# Patient Record
Sex: MALE | Race: OTHER | Employment: OTHER | URBAN - METROPOLITAN AREA
[De-identification: names, ages, dates, MRNs, and addresses within clinical notes are randomized per-mention and may not be internally consistent; named-entity substitution may affect disease eponyms.]

---

## 2017-03-11 DIAGNOSIS — I10 ESSENTIAL HYPERTENSION WITH GOAL BLOOD PRESSURE LESS THAN 130/80: ICD-10-CM

## 2017-03-13 RX ORDER — LISINOPRIL 20 MG/1
TABLET ORAL
Qty: 90 TAB | Refills: 0 | Status: SHIPPED | OUTPATIENT
Start: 2017-03-13 | End: 2017-03-28 | Stop reason: SDUPTHER

## 2017-03-28 ENCOUNTER — OFFICE VISIT (OUTPATIENT)
Dept: FAMILY MEDICINE CLINIC | Age: 57
End: 2017-03-28

## 2017-03-28 VITALS
TEMPERATURE: 97.9 F | WEIGHT: 230 LBS | RESPIRATION RATE: 16 BRPM | HEIGHT: 68 IN | BODY MASS INDEX: 34.86 KG/M2 | OXYGEN SATURATION: 100 % | HEART RATE: 82 BPM | DIASTOLIC BLOOD PRESSURE: 69 MMHG | SYSTOLIC BLOOD PRESSURE: 119 MMHG

## 2017-03-28 DIAGNOSIS — E03.9 ACQUIRED HYPOTHYROIDISM: ICD-10-CM

## 2017-03-28 DIAGNOSIS — I10 ESSENTIAL HYPERTENSION: ICD-10-CM

## 2017-03-28 DIAGNOSIS — K21.9 GASTROESOPHAGEAL REFLUX DISEASE WITHOUT ESOPHAGITIS: Primary | ICD-10-CM

## 2017-03-28 DIAGNOSIS — M1A.0620 CHRONIC GOUT OF LEFT KNEE, UNSPECIFIED CAUSE: ICD-10-CM

## 2017-03-28 RX ORDER — LISINOPRIL 20 MG/1
20 TABLET ORAL DAILY
Qty: 90 TAB | Refills: 3 | Status: SHIPPED | OUTPATIENT
Start: 2017-03-28 | End: 2018-02-01 | Stop reason: SDUPTHER

## 2017-03-28 RX ORDER — OMEPRAZOLE 40 MG/1
40 CAPSULE, DELAYED RELEASE ORAL
Qty: 90 CAP | Refills: 1 | Status: SHIPPED | OUTPATIENT
Start: 2017-03-28 | End: 2018-02-01 | Stop reason: ALTCHOICE

## 2017-03-28 RX ORDER — LEVOTHYROXINE SODIUM 50 UG/1
50 TABLET ORAL
Qty: 90 TAB | Refills: 3 | Status: SHIPPED | OUTPATIENT
Start: 2017-03-28 | End: 2018-02-01 | Stop reason: SDUPTHER

## 2017-03-28 RX ORDER — INDOMETHACIN 50 MG/1
50 CAPSULE ORAL 3 TIMES DAILY
Qty: 30 CAP | Refills: 3 | Status: SHIPPED | OUTPATIENT
Start: 2017-03-28 | End: 2018-02-01 | Stop reason: SDUPTHER

## 2017-03-28 NOTE — MR AVS SNAPSHOT
Visit Information Cassi Blanton y Kaitlini Personal Médico Departamento Teléfono del Dep. Número de visita 3/28/2017  3:40 PM Michael Dior [facility] [phone] Upcoming Health Maintenance Date Due DTaP/Tdap/Td series (1 - Tdap) 3/4/1982 FOBT Q 1 YEAR AGE 50-75 3/4/2011 Alergias  Review Complete El: 3/28/2017 Por: Coleman Kohler LPN A partir del:  3/28/2017 Intensidad Anotado Tipo de reacción [employer] Allopurinol  03/19/2013    Rash Vacunas actuales Steven El [place] Influenza Vaccine 3/19/2015 Influenza Vaccine (Quad) PF 8/30/2016, 10/21/2015 No revisadas esta visita You Were Diagnosed With   
  
 Glennette Renea Gastroesophageal reflux disease without esophagitis    -  Primary ICD-10-CM: K21.9 ICD-9-CM: 530.81 Acquired hypothyroidism     ICD-10-CM: E03.9 ICD-9-CM: 244.9 Essential hypertension     ICD-10-CM: I10 
ICD-9-CM: 401.9 Chronic gout of left knee, unspecified cause     ICD-10-CM: M1A.0620 ICD-9-CM: 274.02 Partes vitales PS Pulso Temperatura Resp [place] ( percentil de crecimiento) Peso (percentil de crecimiento)  
 119/69 82 97.9 °F (36.6 °C) (Oral) 16 5' 8\" (1.727 m) 230 lb (104.3 kg) SpO2 BMI ([facility] Estatus de tabaquísmo 100% 34.97 kg/m2 Never Smoker Historial de signos vitales BMI and BSA Data Body Mass Index Body Surface Area 34.97 kg/m 2 2.24 m 2 [facility] Name Phone [facility], [address] [place] lista de medicamentos actualizada Lista actualizada el: 3/28/17  4:14 PM.  [place] use [place]mons lista de medicamentos más reciente. indomethacin 50 mg capsule También conocido fantasma:  INDOCIN Take 1 Cap by mouth three (3) times daily. levothyroxine 50 mcg tablet También conocido fantasma:  SYNTHROID Take 1 Tab by mouth Daily (before breakfast). lisinopril 20 mg tablet También conocido fantasma:  Jeanna Jose R Take 1 Tab by mouth daily. omeprazole 40 mg capsule También conocido fantasma:  Christa El Take 1 Cap by mouth Daily (before breakfast). polyethylene glycol 17 gram/dose powder También conocido fantasma:  Kathie Kehr Take 17 g by mouth daily. probenecid 500 mg tablet También conocido fantasma:  BENEMID Take 0.5 Tabs by mouth two (2) times a day. For gout prevention. After 1 week, increase to 500 mg twice daily. Recetas Enviado a la Snow Refills  
 levothyroxine (SYNTHROID) 50 mcg tablet 3 Sig: Take 1 Tab by mouth Daily (before breakfast). Class: Normal  
 Pharmacy: [address] Ph #: [phone] Route: Oral  
 lisinopril (PRINIVIL, ZESTRIL) 20 mg tablet 3 Sig: Take 1 Tab by mouth daily. Class: Normal  
 Pharmacy: [address] Ph #: [phone] Route: Oral  
 omeprazole (PRILOSEC) 40 mg capsule 1 Sig: Take 1 Cap by mouth Daily (before breakfast). Class: Normal  
 Pharmacy: [address] Ph #: [phone] Route: Oral  
 indomethacin (INDOCIN) 50 mg capsule 3 Sig: Take 1 Cap by mouth three (3) times daily. Class: Normal  
 Pharmacy: [address] Ph #: [phone] Route: Oral  
  
Hicimos lo siguiente METABOLIC PANEL, COMPREHENSIVE [59137 CPT(R)] REFERRAL TO GASTROENTEROLOGY [QXM58 Custom] Comentarios:  
 Please evaluate patient for uncontrolled acid reflux and possible IBS. [facility], [address] [place] [address] Office: [phone] TSH 3RD GENERATION [53501 CPT(R)] URIC ACID [id number] CPT(R)] Informacion de [employer] de Referencia Referido por Referido a  
  
 [id number] SANDY JAY No disponible Visitas Estado Lexine Daniel de inicio Lexine Daniel final  
 1 New Request 3/28/17 3/28/18 Si timmons referencia tiene un estado de \"pending review\" o \"denied\" , informacion adicional sera enviada para apoyar el resultado de esta decision. Instrucciones para el Paciente Enfermedad de reflujo gastroesofágico (GERD): Instrucciones de cuidado - [ Gastroesophageal Reflux Disease (GERD): Care Instructions ] Instrucciones de cuidado La enfermedad de reflujo gastroesofágico (GERD, por jeovn siglas en inglés) es cuando el ácido estomacal se devuelve hacia el esófago. El esófago es el conducto que va de la garganta al BJURHOLM. Bailey válvula de bailey zeynep vía karen que el ácido del estómago se devuelva por demetria conducto. Cuando usted tiene GERD, esta válvula no se steffanie lo suficientemente firme. Si usted tiene síntomas leves de GERD, fantasma acidez estomacal, es posible que pueda controlar el problema con antiácidos o medicamentos de [employer]. Cambiar la alimentación, bajar de peso y hacer otros cambios en el estilo de peri también pueden ayudar a reducir los síntomas. La atención de seguimiento es bailey parte clave de timmons tratamiento y seguridad. Asegúrese de hacer y acudir a todas las citas, y llame a timmons médico si está teniendo problemas. También es bailey buena idea saber los resultados de los exámenes y mantener bailey lista de los medicamentos que lorenzo. Cómo puede cuidarse en el hogar? · [employer] medicamentos exactamente fantasma le fueron recetados. Llame a timmons médico si ana maria estar teniendo problemas con timmons medicamento. · [place] médico le podría recomendar medicamentos de [facility]. Para la indigestión leve u ocasional pueden servirle los antiácidos fantasma Tums, Gaviscon, Mylanta o Maalox.  [place] médico también podría recomendar reductores de ácido de venta rosa, fantasma Pepcid AC, Tagamet HB, Zantac 75 o Prilosec. Mayuri y siga todas las instrucciones de la [place]. Si [place] estos medicamentos con frecuencia, hable con rhodes médico. 
· Cambie mary hábitos alimenticios. ¨ Es mejor comer varias comidas pequeñas en lugar de dos o yovani comidas abundantes. ¨ Después de comer, espere de 2 a 3 horas antes de recostarse. ¨ El chocolate, la menta y el alcohol pueden empeorar la GERD. ¨ En [employer], los alimentos condimentados, los alimentos que tienen mucho ácido (fantasma los tomates y las naranjas) y el café pueden empeorar los síntomas de la GERD. Si mary síntomas empeoran después de comer un determinado alimento, se recomienda que deje de comer alpesh alimento para narcisa si mary síntomas mejoran. · No fume ni masque tabaco. Fumar puede agravar la GERD. Si necesita ayuda para dejar de usar tabaco, hable con rhodes médico [employer] y medicamentos para dejar de usarlo. Éstos pueden aumentar mary probabilidades de dejar el hábito para siempre. · Si tiene síntomas de GERD matt la noche, eleve la cabecera de rhodes cama entre 6 y 6 pulgadas (entre 15 y 20 cm) colocando ladrillos debajo del paula de la cama o bailey cuña de espuma debajo de la cabecera del colchón. (Usar almohadas adicionales no funciona). · No use ropa que le ajuste mucho la parte media del cuerpo. · Baje de peso, si necesita hacerlo. Perder sólo de 5 a 10 libras (2.3 a 4.5 kg) puede ayudarle. Cuándo debe pedir ayuda? Llame a rhodes médico ahora mismo o busque atención médica inmediata si: · Tiene un dolor de estómago nuevo o distinto. · Mary heces son negruzcas y parecidas al alquitrán o tienen rastros de [place]. Preste especial atención a los cambios en rhodes filomena y asegúrese de comunicarse con rhodes médico si: 
· Mary síntomas no rowe willam después de 2 días. · La comida parece quedarle atorada en la garganta o el pecho. Dónde puede encontrar más información en inglés? Leoncio Scott a http://wen-alek.info/. Markharjit Saxena E339 en la búsqueda para aprender más acerca de \"Enfermedad de reflujo gastroesofágico (GERD): Instrucciones de cuidado - [ Gastroesophageal Reflux Disease (GERD): Care Instructions ]. \" 
Revisado: 9 agosto, 2016 Versión del contenido: 11.2 © 6034-5499 Healthwise, Incorporated. Las instrucciones de cuidado fueron adaptadas bajo licencia por Good Help Connections (which disclaims liability or warranty for this information). Si usted tiene [place] afección médica o sobre estas instrucciones, siempre pregunte a rhodes profesional de filomena. Healthwise, Incorporated niega toda garantía o responsabilidad por rhodes uso de esta información. Introducing [facility]! Estimado Roberto : 
Ciara por solicitar bailey cuenta de Toni singh. Nuestros registros indican que usted ya tiene bailey cuenta [facility]. Usted puede acceder a rhodes cuenta en cualquier momento en https://[employer]. [employer]/mychart Sabía usted que usted puede acceder a rhodes hospital y las instrucciones de suki ER en cualquier momento en MyChart ? También puede revisar [facility] de las pruebas de rhodes hospitalización o visita a urgencias . Información Adicional 
 
Si tiene alguna pregunta , por favor visite la sección de preguntas frecuentes del sitio web [employer] en https://[employer] Software[employer]ODUCTION[employer]. [employer]/mycGrand Perfectat/ . Recuerde, [employer] NO es que se utilizará para las necesidades urgentes. Para emergencias médicas , llame al 911 . Ahora disponible en rhodes iPhone y Android ! Por favor proporcione demetria resumen de la documentación de cuidado a rhodes próximo proveedor. Your primary care clinician is listed as Gina Karine. If you have any questions after today's visit, please call [phone].

## 2017-03-28 NOTE — PATIENT INSTRUCTIONS
Enfermedad de reflujo gastroesofágico (GERD): Instrucciones de cuidado - [ Gastroesophageal Reflux Disease (GERD): Care Instructions ]  Instrucciones de cuidado    La enfermedad de reflujo gastroesofágico (GERD, por jevon siglas en inglés) es cuando el ácido estomacal se devuelve hacia el esófago. El esófago es el conducto que va de la garganta al [facility]. Bailey válvula de bailey zeynep vía karen que el ácido del estómago se devuelva por demetria conducto. Cuando usted tiene GERD, esta válvula no se steffanie lo suficientemente firme. Si usted tiene síntomas leves de GERD, fantasma acidez estomacal, es posible que pueda controlar el problema con antiácidos o medicamentos de [employer]. Cambiar la alimentación, bajar de peso y hacer otros cambios en el estilo de peri también pueden ayudar a reducir los síntomas. La atención de seguimiento es bailey parte clave de rhodes tratamiento y seguridad. Asegúrese de hacer y acudir a todas las citas, y llame a rhodes médico si está teniendo problemas. También es bailey buena idea saber los resultados de los exámenes y mantener bailey lista de los medicamentos que lorenzo. ¿Cómo puede cuidarse en el hogar? · [employer] medicamentos exactamente fantasma le fueron recetados. Llame a rhodes médico si aan maria estar teniendo problemas con rhodes medicamento. · [place] médico le podría recomendar medicamentos de [employer]. Para la indigestión leve u ocasional pueden servirle los antiácidos fantasma Tums, Gaviscon, Mylanta o Maalox. [place] médico también podría recomendar reductores de ácido de venta rosa, fantasma Pepcid AC, Tagamet HB, Zantac 75 o Prilosec. Mayuri y siga todas las instrucciones de la [place]. Si [place] estos medicamentos con frecuencia, hable con rhodes médico.  · Cambie jevon hábitos alimenticios. ¨ Es mejor comer varias comidas pequeñas en lugar de dos o yovani comidas abundantes. ¨ Después de comer, espere de 2 a 3 horas antes de recostarse. ¨ El chocolate, la menta y el alcohol pueden empeorar la GERD.   ¨ En [employer], los alimentos condimentados, los alimentos que tienen mucho ácido (fantasma los tomates y las naranjas) y el café pueden empeorar los síntomas de la GERD. Si mary síntomas empeoran después de comer un determinado alimento, se recomienda que deje de comer alpesh alimento para narcisa si mary síntomas mejoran. · No fume ni masque tabaco. Fumar puede agravar la GERD. Si necesita ayuda para dejar de usar tabaco, hable con rhodes médico [employer] y medicamentos para dejar de usarlo. Éstos pueden aumentar mary probabilidades de dejar el hábito para siempre. · Si tiene síntomas de GERD matt la noche, eleve la cabecera de rhodes cama entre 6 y 6 pulgadas (entre 15 y 20 cm) colocando ladrillos debajo del paula de la cama o bailey cuña de espuma debajo de la cabecera del colchón. (Usar almohadas adicionales no funciona). · No use ropa que le ajuste mucho la parte media del cuerpo. · Baje de peso, si necesita hacerlo. Perder sólo de 5 a 10 libras (2.3 a 4.5 kg) puede ayudarle. ¿Cuándo debe pedir ayuda? Llame a rhodes médico ahora mismo o busque atención médica inmediata si:  · Tiene un dolor de estómago nuevo o distinto. · Mary heces son negruzcas y parecidas al alquitrán o tienen rastros de [place]. Preste especial atención a los cambios en rhodes filomena y asegúrese de comunicarse con rhodes médico si:  · Mary síntomas no rowe willam después de 2 días. · La comida parece quedarle atorada en la garganta o el pecho. ¿Dónde puede encontrar más información en inglés? Hiral Mak a http://wen-alek.info/. Marcelina Mirela H400 en la búsqueda para aprender más acerca de \"Enfermedad de reflujo gastroesofágico (GERD): Instrucciones de cuidado - [ Gastroesophageal Reflux Disease (GERD): Care Instructions ]. \"  Revisado: 9 agosto, 2016  Versión del contenido: 11.2  © 5376-8046 [employer], [employer]. Las instrucciones de cuidado fueron adaptadas bajo licencia por Good Help Connections (which disclaims liability or warranty for this information). Si usted tiene Bertie [place] afección médica o sobre estas instrucciones, siempre pregunte a rhodes profesional de filomena. [facility], Incorporated niega toda garantía o responsabilidad por rhodes uso de esta información.

## 2017-03-28 NOTE — PROGRESS NOTES
Outpatient Resident Progress Note    History of Present Illness:     Pt is a 65y.o. year old male, who presents to clinic for:    Chief Complaint   Patient presents with    GERD    Gout     knee and elbow       Patient comes in today for follow up. Has multiple medical problems. Reports compliance with medications and diet. Med list and most recent labs/studies reviewed with the patient. Not trying to be active physically to control weight. Needs med refills. Due for repeat labs. Complaining of gout flare 2 weeks ago on right knee, works as , tried indomethacin once and helped. Also complaining of reflux, this is a chronic problem, has been on Prilosec 40 mg for months, symptoms not improving despite meds and lifestyle modification. Past Medical History:   Diagnosis Date    Essential hypertension 7/10/2015    GERD without esophagitis     Gout 2000    Hypertension     Hypothyroidism, adult 7/10/2015    Thyroid disease     Hypothyroidism         Current Outpatient Prescriptions on File Prior to Visit   Medication Sig Dispense Refill    levothyroxine (SYNTHROID) 50 mcg tablet Take 1 Tab by mouth Daily (before breakfast). 90 Tab 3    lisinopril (PRINIVIL, ZESTRIL) 20 mg tablet Take 1 Tab by mouth daily. 90 Tab 3    indomethacin (INDOCIN) 50 mg capsule Take 1 Cap by mouth three (3) times daily. 30 Cap 3    omeprazole (PRILOSEC) 40 mg capsule Take 1 Cap by mouth Daily (before breakfast). 90 Cap 1    probenecid (BENEMID) 500 mg tablet Take 0.5 Tabs by mouth two (2) times a day. For gout prevention. After 1 week, increase to 500 mg twice daily. 60 Tab 5    polyethylene glycol (MIRALAX) 17 gram/dose powder Take 17 g by mouth daily. 510 g 2     No current facility-administered medications on file prior to visit. Allergies: Allergies   Allergen Reactions    Allopurinol Rash         ROS  No fevers, chills, unexpected weight changes. Normal appetite and bowel function.  No chest pain or pressure, no new dyspnea on exertion. + right knee pain, GERD    Objective:     Vitals:    03/28/17 1557   BP: 119/69   Pulse: 82   Resp: 16   Temp: 97.9 °F (36.6 °C)   TempSrc: Oral   SpO2: 100%   Weight: 230 lb (104.3 kg)   Height: 5' 8\" (1.727 m)        Physical Exam:  General appearance - alert, well appearing, and in no distress  Neck - supple, no significant adenopathy   Chest - clear to auscultation, no wheezes, rales or rhonchi, symmetric air entry  Heart - normal rate, regular rhythm, normal S1, S2, no murmurs, rubs, clicks or gallops  Abdomen - soft, nontender, nondistended, no masses or organomegaly  Neurological - alert, oriented, normal speech, no focal findings or movement disorder   Extremities - peripheral pulses normal, no pedal edema, no clubbing or cyanosis, no joint pain, full ROM in all 4 extremities  Skin - normal coloration and turgor, no rashes, no suspicious skin lesions noted    Assessment and Plan:   Pt is a 65y.o. year old male. Will refer to GI for evaluation and possible EGD, Indomethacin for acute gout, check labs today as below and medications refilled. ICD-10-CM ICD-9-CM    1. Gastroesophageal reflux disease without esophagitis K21.9 530.81 omeprazole (PRILOSEC) 40 mg capsule      REFERRAL TO GASTROENTEROLOGY   2. Acquired hypothyroidism E03.9 244.9 levothyroxine (SYNTHROID) 50 mcg tablet      TSH 3RD GENERATION   3. Essential hypertension I10 401.9 lisinopril (PRINIVIL, ZESTRIL) 20 mg tablet      METABOLIC PANEL, COMPREHENSIVE   4. Chronic gout of left knee, unspecified cause M1A.0620 274.02 indomethacin (INDOCIN) 50 mg capsule      URIC ACID           I have discussed the diagnosis with the patient and the intended plan as seen in the above orders. The patient has received an after-visit summary and questions were answered concerning future plans.     Yair Sosa MD  PGY-3 Family Medicine Resident

## 2017-03-29 LAB
ALBUMIN SERPL-MCNC: 4.5 G/DL (ref 3.5–5.5)
ALBUMIN/GLOB SERPL: 1.9 {RATIO} (ref 1.2–2.2)
ALP SERPL-CCNC: 56 IU/L (ref 39–117)
ALT SERPL-CCNC: 25 IU/L (ref 0–44)
AST SERPL-CCNC: 20 IU/L (ref 0–40)
BILIRUB SERPL-MCNC: 0.4 MG/DL (ref 0–1.2)
BUN SERPL-MCNC: 13 MG/DL (ref 6–24)
BUN/CREAT SERPL: 13 (ref 9–20)
CALCIUM SERPL-MCNC: 9.7 MG/DL (ref 8.7–10.2)
CHLORIDE SERPL-SCNC: 102 MMOL/L (ref 96–106)
CO2 SERPL-SCNC: 23 MMOL/L (ref 18–29)
CREAT SERPL-MCNC: 0.99 MG/DL (ref 0.76–1.27)
GLOBULIN SER CALC-MCNC: 2.4 G/DL (ref 1.5–4.5)
GLUCOSE SERPL-MCNC: 106 MG/DL (ref 65–99)
POTASSIUM SERPL-SCNC: 4.4 MMOL/L (ref 3.5–5.2)
PROT SERPL-MCNC: 6.9 G/DL (ref 6–8.5)
SODIUM SERPL-SCNC: 140 MMOL/L (ref 134–144)
TSH SERPL DL<=0.005 MIU/L-ACNC: 2.02 UIU/ML (ref 0.45–4.5)
URATE SERPL-MCNC: 10 MG/DL (ref 3.7–8.6)

## 2017-04-07 ENCOUNTER — TELEPHONE (OUTPATIENT)
Dept: FAMILY MEDICINE CLINIC | Age: 57
End: 2017-04-07

## 2017-04-07 NOTE — TELEPHONE ENCOUNTER
Nila Meng, Dr. Prabhakar Perkins  [phone]--[phone]  appt April 24, endoscopy  Need referral.    [phone]---[phone]    Tony Hatch has [employer] plan. Not reflected in chart. Also said patient was seen today but they had a referral for the visit of today. There is no message of any appointment for today in chart and the gastro referral ordered by Dr. Ambar Stanton is still open.

## 2017-04-12 NOTE — TELEPHONE ENCOUNTER
Patient is calling to change PCP to physician in this office. .. Referral is to be backdated to 4/7/17 with Dr Sherry Pimentel. ...

## 2017-04-14 NOTE — TELEPHONE ENCOUNTER
Still waiting on patient to change PCP to physician in this office. ... Then referral will be submitted. ...

## 2017-04-19 NOTE — TELEPHONE ENCOUNTER
Spoke with patient again for the 3rd time to get his PCP change to physician in this office so I can submit a referral for his 4/7/17 visit with Dr Norma Johnson (gastro). ...

## 2017-09-15 ENCOUNTER — OFFICE VISIT (OUTPATIENT)
Dept: FAMILY MEDICINE CLINIC | Age: 57
End: 2017-09-15

## 2017-09-15 VITALS
DIASTOLIC BLOOD PRESSURE: 73 MMHG | TEMPERATURE: 98 F | HEART RATE: 64 BPM | HEIGHT: 68 IN | WEIGHT: 229 LBS | SYSTOLIC BLOOD PRESSURE: 151 MMHG | OXYGEN SATURATION: 99 % | RESPIRATION RATE: 18 BRPM | BODY MASS INDEX: 34.71 KG/M2

## 2017-09-15 DIAGNOSIS — Z23 ENCOUNTER FOR IMMUNIZATION: ICD-10-CM

## 2017-09-15 DIAGNOSIS — I10 ESSENTIAL HYPERTENSION: Primary | ICD-10-CM

## 2017-09-15 DIAGNOSIS — M10.9 GOUT OF RIGHT FOOT, UNSPECIFIED CAUSE, UNSPECIFIED CHRONICITY: ICD-10-CM

## 2017-09-15 NOTE — PROGRESS NOTES
Chief Complaint   Patient presents with   [facility] Medication Refill

## 2017-09-15 NOTE — PROGRESS NOTES
Fortunato Martínez is a 65 y.o. male who presents   Gout  - no recent attacks. Admits he does not take his probenecid daily. - tries to eat less red meat. Occasional beers    HTN  - reports compliance with medications. Trying to watch his diet and walk daily. - denies HA, dizziness, CP, SOB, changes in vision, RUQ pain. ROS: (positive in bold)  General: wt loss, fever, fatigue or appetite change   Skin: rashes or suspicious skin lesions  HEENT: changes in vision, smell, taste, hearing, earache, tinnitus, hoarseness, dysphagia, congestion, sore throat  Cardiac: chest pain, palpitations, ROMERO, orthopnea, PND, edema   Pul: SOB, dyspnea, wheezing, cough, hemoptysis  GI: abdominal pain, N&V, diarrhea, constipation, hematemsis, melena,   : hematuria, dysuria, freq, urgency, incontinence   MS: joint pain, swelling, stiffness, myalgia, back pain  Neuro: weakness, parasthesias, headache, syncope, seizure  Psych: anxiety, depression    Past Medical History:  Past Medical History:   Diagnosis Date    Essential hypertension 7/10/2015    GERD without esophagitis     Gout 2000    Hypertension     Hypothyroidism, adult 7/10/2015    Thyroid disease     Hypothyroidism       Past Surgical History:  History reviewed. No pertinent surgical history. Family History:  Family History   Problem Relation Age of Onset    Hypertension Mother     Diabetes Mother      He thinks she had when older    Cancer Father 76     lung (non-smoker)       Allergies:   Allergies   Allergen Reactions    Allopurinol Rash       Social History:  Social History   Substance Use Topics    Smoking status: Never Smoker    Smokeless tobacco: Never Used    Alcohol use 0.0 oz/week     0 Standard drinks or equivalent per week      Comment: Ocassional       Current Meds:  Current Outpatient Prescriptions on File Prior to Visit   Medication Sig Dispense Refill    levothyroxine (SYNTHROID) 50 mcg tablet Take 1 Tab by mouth Daily (before breakfast). 90 Tab 3    lisinopril (PRINIVIL, ZESTRIL) 20 mg tablet Take 1 Tab by mouth daily. 90 Tab 3    omeprazole (PRILOSEC) 40 mg capsule Take 1 Cap by mouth Daily (before breakfast). 90 Cap 1    indomethacin (INDOCIN) 50 mg capsule Take 1 Cap by mouth three (3) times daily. 30 Cap 3    probenecid (BENEMID) 500 mg tablet Take 0.5 Tabs by mouth two (2) times a day. For gout prevention. After 1 week, increase to 500 mg twice daily. 60 Tab 5    polyethylene glycol (MIRALAX) 17 gram/dose powder Take 17 g by mouth daily. 510 g 2     No current facility-administered medications on file prior to visit. Visit Vitals    /73 (BP 1 Location: Left arm, BP Patient Position: Sitting)    Pulse 64    Temp 98 °F (36.7 °C) (Oral)    Resp 18    Ht 5' 8\" (1.727 m)    Wt 229 lb (103.9 kg)    SpO2 99%    BMI 34.82 kg/m2       Gen: Well developed, well nourished male in no acute distress  HEENT: normocephalic/atraumatic; PERRL; TM intact, translucent, and neutral BL;  oropharynx shows no erythema or exudates  Skin:  No rashes or suspicious skin lesions noted  Neck:   Supple, no lympadenopathy, no thyromegaly  Card:  RRR, no m/r/g  Chest:  CTAB, no w/r/r      A/P:      ICD-10-CM ICD-9-CM    1. Essential hypertension I10 401.9    2. Gout of right foot, unspecified cause, unspecified chronicity M10.9 274.9       HTN  - currently asymptomatic  - possibly outlier - all other BP readings are normal  - advised to f/u in 4 weeks for BP recheck - will change meds if still high    Gout  - currently asymptomatic  - counseled on taking probenecid daily.

## 2017-09-15 NOTE — PATIENT INSTRUCTIONS

## 2017-09-15 NOTE — MR AVS SNAPSHOT
Visit Information [place] Rene Personal Médico Departamento Teléfono del Dep. Número de visita 9/15/2017  9:15 AM Marquita Stockton [phone] [id number] Follow-up Instructions Return in about 4 weeks (around 10/13/2017) for PCP for hypertension. Upcoming Health Maintenance Date Due DTaP/Tdap/Td series (1 - Tdap) 3/4/1982 FOBT Q 1 YEAR AGE 50-75 3/4/2011 INFLUENZA AGE 9 TO ADULT 8/1/2017 Alergias  Review Complete El: 9/15/2017 Por: Susana Palencia LPN A partir del:  9/15/2017 Intensidad Anotado Tipo de reacción [employer] Allopurinol  03/19/2013    Rash Vacunas actuales Briseyda García Jeannie Influenza Vaccine 3/19/2015 Influenza Vaccine (Quad) PF 8/30/2016, 10/21/2015 No revisadas esta visita You Were Diagnosed With   
  
 Shellie Nguyen Essential hypertension    -  Primary ICD-10-CM: I10 
ICD-9-CM: 401.9 Encounter for long-term (current) use of medications     ICD-10-CM: Z79.899 ICD-9-CM: V58.69 Partes vitales PS Pulso Temperatura Resp [place] ( percentil de crecimiento) Peso (percentil de crecimiento) 151/73 (BP 1 Location: Left arm, BP Patient Position: Sitting) 64 98 °F (36.7 °C) (Oral) 18 5' 8\" (1.727 m) 229 lb (103.9 kg) SpO2 BMI ([facility] Estatus de tabaquísmo 99% 34.82 kg/m2 Never Smoker Historial de signos vitales BMI and BSA Data Body Mass Index Body Surface Area 34.82 kg/m 2 2.23 m 2 [facility] Name Phone [facility], [address] [place] [address] Timmons lista de medicamentos actualizada Lista actualizada el: 9/15/17  9:57 AM.  Cele Reed use timmons lista de medicamentos más reciente. indomethacin 50 mg capsule También conocido fantasma:  INDOCIN Take 1 Cap by mouth three (3) times daily. levothyroxine 50 mcg tablet También conocido fantasma:  SYNTHROID Take 1 Tab by mouth Daily (before breakfast). lisinopril 20 mg tablet También conocido fantasma:  Batsheva [place] Take 1 Tab by mouth daily. omeprazole 40 mg capsule También conocido fantasma:  Gordo Reyez Take 1 Cap by mouth Daily (before breakfast). polyethylene glycol 17 gram/dose powder También conocido fantasma:  Ronen Barahona Take 17 g by mouth daily. probenecid 500 mg tablet También conocido fantasma:  BENEMID Take 0.5 Tabs by mouth two (2) times a day. For gout prevention. After 1 week, increase to 500 mg twice daily. Instrucciones de seguimiento Return in about 4 weeks (around 10/13/2017) for PCP for hypertension. Instrucciones para el Paciente High Blood Pressure: Care Instructions Your Care Instructions If your blood pressure is usually above 140/90, you have high blood pressure, or hypertension. That means the top number is 140 or higher or the bottom number is 90 or higher, or both. Despite what a lot of people think, high blood pressure usually doesn't cause headaches or make you feel dizzy or lightheaded. It usually has no symptoms. But it does increase your risk for heart attack, stroke, and kidney or eye damage. The higher your blood pressure, the more your risk increases. Your doctor will give you a goal for your blood pressure. Your goal will be based on your health and your age. An example of a goal is to keep your blood pressure below 140/90. Lifestyle changes, such as eating healthy and being active, are always important to help lower blood pressure. You might also take medicine to reach your blood pressure goal. 
Follow-up care is a key part of your treatment and safety. Be sure to make and go to all appointments, and call your doctor if you are having problems. It's also a good idea to know your test results and keep a list of the medicines you take. How can you care for yourself at home? Medical treatment · If you stop taking your medicine, your blood pressure will go back up. You may take one or more types of medicine to lower your blood pressure. Be safe with medicines. Take your medicine exactly as prescribed. Call your doctor if you think you are having a problem with your medicine. · Talk to your doctor before you start taking aspirin every day. Aspirin can help certain people lower their risk of a heart attack or stroke. But taking aspirin isn't right for everyone, because it can cause serious bleeding. · See your doctor regularly. You may need to see the doctor more often at first or until your blood pressure comes down. · If you are taking blood pressure medicine, talk to your doctor before you take decongestants or anti-inflammatory medicine, such as ibuprofen. Some of these medicines can raise blood pressure. · Learn how to check your blood pressure at home. Lifestyle changes · Stay at a healthy weight. This is especially important if you put on weight around the waist. Losing even 10 pounds can help you lower your blood pressure. · If your doctor recommends it, get more exercise. Walking is a good choice. Bit by bit, increase the amount you walk every day. Try for at least 30 minutes on most days of the week. You also may want to swim, bike, or do other activities. · Avoid or limit alcohol. Talk to your doctor about whether you can drink any alcohol. · Try to limit how much sodium you eat to less than 2,300 milligrams (mg) a day. Your doctor may ask you to try to eat less than 1,500 mg a day. · Eat plenty of fruits (such as bananas and oranges), vegetables, legumes, whole grains, and low-fat dairy products. · Lower the amount of saturated fat in your diet. Saturated fat is found in animal products such as milk, cheese, and meat. Limiting these foods may help you lose weight and also lower your risk for heart disease. · Do not smoke. Smoking increases your risk for heart attack and stroke. If you need help quitting, talk to your doctor about stop-smoking programs and medicines. These can increase your chances of quitting for good. When should you call for help? Call 911 anytime you think you may need emergency care. This may mean having symptoms that suggest that your blood pressure is causing a serious heart or blood vessel problem. Your blood pressure may be over 180/110. For example, call 911 if: 
· You have symptoms of a heart attack. These may include: ¨ Chest pain or pressure, or a strange feeling in the chest. 
¨ Sweating. ¨ Shortness of breath. ¨ Nausea or vomiting. ¨ Pain, pressure, or a strange feeling in the back, neck, jaw, or upper belly or in one or both shoulders or arms. ¨ Lightheadedness or sudden weakness. ¨ A fast or irregular heartbeat. · You have symptoms of a stroke. These may include: 
¨ Sudden numbness, tingling, weakness, or loss of movement in your face, arm, or leg, especially on only one side of your body. ¨ Sudden vision changes. ¨ Sudden trouble speaking. ¨ Sudden confusion or trouble understanding simple statements. ¨ Sudden problems with walking or balance. ¨ A sudden, severe headache that is different from past headaches. · You have severe back or belly pain. Do not wait until your blood pressure comes down on its own. Get help right away. Call your doctor now or seek immediate care if: 
· Your blood pressure is much higher than normal (such as 180/110 or higher), but you don't have symptoms. · You think high blood pressure is causing symptoms, such as: ¨ Severe headache. ¨ Blurry vision. Watch closely for changes in your health, and be sure to contact your doctor if: 
· Your blood pressure measures 140/90 or higher at least 2 times. That means the top number is 140 or higher or the bottom number is 90 or higher, or both. · You think you may be having side effects from your blood pressure medicine. · Your blood pressure is usually normal, but it goes above normal at least 2 times. Where can you learn more? Go to http://wen-alek.info/. Enter O799 in the search box to learn more about \"High Blood Pressure: Care Instructions. \" Current as of: August 8, 2016 Content Version: 11.3 © 4787-4486 [employer]. Care instructions adapted under license by [employer] (which disclaims liability or warranty for this information). If you have questions about a medical condition or this instruction, always ask your healthcare professional. [place] [address] any warranty or liability for your use of this information. Introducing [facility]! Estimado Roberto : 
Ciara por solicitar bailey cuenta de MyChart usted. Nuestros registros indican que usted ya tiene bailey cuenta MyChart [facility]. Usted puede acceder a rhodes cuenta en cualquier momento en [email or web address]://[employer]mychart Sabía usted que usted puede acceder a rhodes hospital y las instrucciones de suki ER en cualquier momento en MyChart ? También puede revisar [facility] de las pruebas de rhodes hospitalización o visita a urgencias . Información Adicional 
 
Si tiene alguna pregunta , por favor visite la sección de preguntas frecuentes del sitio web MyChart en [email or web address] . Recuerde, MyChart NO es que se utilizará para las necesidades urgentes. Para emergencias médicas , llame al 911 . Ahora disponible en rhodes iPhone y Android ! Por favor proporcione demetria resumen de la documentación de cuidado a rhodes próximo proveedor. Your primary care clinician is listed as Loni Kimble. If you have any questions after today's visit, please call [phone].

## 2017-09-17 DIAGNOSIS — K59.01 SLOW TRANSIT CONSTIPATION: ICD-10-CM

## 2017-09-17 NOTE — TELEPHONE ENCOUNTER
Patient needs a refill of the following  Requested Prescriptions     Pending Prescriptions Disp Refills    polyethylene glycol (MIRALAX) 17 gram/dose powder 510 g 2     Sig: Take 17 g by mouth daily.

## 2017-09-21 RX ORDER — POLYETHYLENE GLYCOL 3350 17 G/17G
17 POWDER, FOR SOLUTION ORAL DAILY
Qty: 510 G | Refills: 2 | Status: SHIPPED | OUTPATIENT
Start: 2017-09-21 | End: 2018-02-01 | Stop reason: ALTCHOICE

## 2018-01-22 DIAGNOSIS — M1A.0620 CHRONIC GOUT OF LEFT KNEE, UNSPECIFIED CAUSE: ICD-10-CM

## 2018-01-22 NOTE — TELEPHONE ENCOUNTER
Patient needs a refill of the following  Requested Prescriptions     Pending Prescriptions Disp Refills    indomethacin (INDOCIN) 50 mg capsule 30 Cap 3     Sig: Take 1 Cap by mouth three (3) times daily.

## 2018-01-23 RX ORDER — INDOMETHACIN 50 MG/1
50 CAPSULE ORAL 3 TIMES DAILY
Qty: 30 CAP | Refills: 3 | OUTPATIENT
Start: 2018-01-23

## 2018-02-01 ENCOUNTER — OFFICE VISIT (OUTPATIENT)
Dept: FAMILY MEDICINE CLINIC | Age: 58
End: 2018-02-01

## 2018-02-01 ENCOUNTER — HOSPITAL ENCOUNTER (OUTPATIENT)
Dept: LAB | Age: 58
Discharge: HOME | End: 2018-02-01

## 2018-02-01 VITALS
BODY MASS INDEX: 34.4 KG/M2 | TEMPERATURE: 97.7 F | DIASTOLIC BLOOD PRESSURE: 88 MMHG | SYSTOLIC BLOOD PRESSURE: 134 MMHG | HEART RATE: 78 BPM | WEIGHT: 227 LBS | HEIGHT: 68 IN

## 2018-02-01 DIAGNOSIS — E03.9 ACQUIRED HYPOTHYROIDISM: ICD-10-CM

## 2018-02-01 DIAGNOSIS — I10 ESSENTIAL HYPERTENSION: Primary | ICD-10-CM

## 2018-02-01 DIAGNOSIS — M1A.0620 CHRONIC GOUT OF LEFT KNEE, UNSPECIFIED CAUSE: ICD-10-CM

## 2018-02-01 DIAGNOSIS — I10 ESSENTIAL HYPERTENSION: ICD-10-CM

## 2018-02-01 PROCEDURE — 81003 URINALYSIS AUTO W/O SCOPE: CPT | Performed by: NURSE PRACTITIONER

## 2018-02-01 PROCEDURE — 83036 HEMOGLOBIN GLYCOSYLATED A1C: CPT | Performed by: NURSE PRACTITIONER

## 2018-02-01 PROCEDURE — 82043 UR ALBUMIN QUANTITATIVE: CPT | Performed by: NURSE PRACTITIONER

## 2018-02-01 PROCEDURE — 84443 ASSAY THYROID STIM HORMONE: CPT | Performed by: NURSE PRACTITIONER

## 2018-02-01 PROCEDURE — 80053 COMPREHEN METABOLIC PANEL: CPT | Performed by: NURSE PRACTITIONER

## 2018-02-01 RX ORDER — LISINOPRIL 20 MG/1
20 TABLET ORAL DAILY
Qty: 90 TAB | Refills: 3 | Status: SHIPPED | OUTPATIENT
Start: 2018-02-01 | End: 2019-03-18 | Stop reason: SDUPTHER

## 2018-02-01 RX ORDER — LEVOTHYROXINE SODIUM 50 UG/1
50 TABLET ORAL
Qty: 90 TAB | Refills: 3 | Status: SHIPPED | OUTPATIENT
Start: 2018-02-01 | End: 2019-08-07 | Stop reason: SDUPTHER

## 2018-02-01 RX ORDER — INDOMETHACIN 50 MG/1
50 CAPSULE ORAL
Qty: 30 CAP | Refills: 3 | Status: SHIPPED | OUTPATIENT
Start: 2018-02-01 | End: 2019-08-07 | Stop reason: SDUPTHER

## 2018-02-01 RX ORDER — DICLOFENAC SODIUM 10 MG/G
2 GEL TOPICAL 4 TIMES DAILY
Qty: 100 G | Refills: 2 | Status: SHIPPED | OUTPATIENT
Start: 2018-02-01 | End: 2020-08-14

## 2018-02-01 RX ORDER — PROBENECID 500 MG/1
500 TABLET, FILM COATED ORAL 2 TIMES DAILY
Qty: 60 TAB | Refills: 5 | Status: SHIPPED | OUTPATIENT
Start: 2018-02-01 | End: 2020-08-14 | Stop reason: SDUPTHER

## 2018-02-01 NOTE — PROGRESS NOTES
Coordination of Care  1. Have you been to the ER, urgent care clinic since your last visit? Hospitalized since your last visit? No    2. Have you seen or consulted any other health care providers outside of the [address] [place] [address] since your last visit? Include any pap smears or colon screening. No    Medications  Does the patient need refills? YES    Learning Assessment Complete?  yes

## 2018-02-01 NOTE — MR AVS SNAPSHOT
[address] [place] [address] [address] [facility] 
[phone] Patient: Froilan Townsend MRN: [id number] VZO:[birth year] Visit Information Bartolome Ng Personal Médico Departamento Teléfono del Dep. Número de visita 2/1/2018 11:30 AM Jayne Wen NP [address] [phone] Follow-up Instructions Return in about 3 months (around 5/1/2018). Upcoming Health Maintenance Date Due DTaP/Tdap/Td series (1 - Tdap) 3/4/1982 FOBT Q 1 YEAR AGE 50-75 3/4/2011 Alergias  Review Complete El: 2/1/2018 Por: EMMA Vinson Shaper del:  2/1/2018 Intensidad Anotado Tipo de reacción [employer] Allopurinol  03/19/2013    Rash Vacunas actuales Ramond [place] Sunitha Stallion Influenza Vaccine 3/19/2015 Influenza Vaccine (Quad) PF 9/15/2017, 8/30/2016, 10/21/2015 No revisadas esta visita You Were Diagnosed With   
  
 Sandra Arabic Essential hypertension    -  Primary ICD-10-CM: I10 
ICD-9-CM: 401.9 Acquired hypothyroidism     ICD-10-CM: E03.9 ICD-9-CM: 593. 9 Chronic gout of left knee, unspecified cause     ICD-10-CM: M1A.0620 ICD-9-CM: 274.02 Partes vitales PS Pulso Temperatura [place] ( percentil de crecimiento) Peso (percentil de crecimiento) BMI ([facility])  
 134/88 (BP 1 Location: Left arm, BP Patient Position: Sitting) 78 97.7 °F (36.5 °C) (Oral) 5' 7.91\" (1.725 m) 227 lb (103 kg) 34.6 kg/m2 Estatus de tabaquísmo Never Smoker Historial de signos vitales BMI and BSA Data Body Mass Index Body Surface Area  
 34.6 kg/m 2 2.22 m 2 [facility] [address] [facility] [address] [place] [address] [phone] Timmons lista de medicamentos actualizada Lista actualizada el: 2/1/18  2:06 PM.  Ranjith Scott use rhodes lista de medicamentos más reciente. diclofenac 1 % Gel También conocido fantasma:  VOLTAREN Apply 2 g to affected area four (4) times daily. indomethacin 50 mg capsule También conocido fantasma:  INDOCIN Take 1 Cap by mouth three (3) times daily as needed. levothyroxine 50 mcg tablet También conocido fantasma:  SYNTHROID Take 1 Tab by mouth Daily (before breakfast). lisinopril 20 mg tablet También conocido fantasma:  Ladona Dunks Take 1 Tab by mouth daily. probenecid 500 mg tablet También conocido fantasma:  BENEMID Take 1 Tab by mouth two (2) times a day. For gout prevention. Recetas Enviado a la Obion Refills  
 levothyroxine (SYNTHROID) 50 mcg tablet 3 Sig: Take 1 Tab by mouth Daily (before breakfast). Class: Normal  
 Pharmacy: [facility] [address], [address] Ph #: [phone] Route: Oral  
 indomethacin (INDOCIN) 50 mg capsule 3 Sig: Take 1 Cap by mouth three (3) times daily as needed. Class: Normal  
 Pharmacy: [facility] [address], [address] Ph #: [phone] Route: Oral  
 lisinopril (PRINIVIL, ZESTRIL) 20 mg tablet 3 Sig: Take 1 Tab by mouth daily. Class: Normal  
 Pharmacy: [facility] [address], [address] Ph #: [phone] Route: Oral  
 probenecid (BENEMID) 500 mg tablet 5 Sig: Take 1 Tab by mouth two (2) times a day. For gout prevention. Class: Normal  
 Pharmacy: [facility] [address], [address] Ph #: [phone] Route: Oral  
 diclofenac (VOLTAREN) 1 % gel 2 Sig: Apply 2 g to affected area four (4) times daily. Class: Normal  
 Pharmacy: [facility] [address], [address] Ph #: [phone]  Route: Topical  
  
 Instrucciones de seguimiento Return in about 3 months (around 5/1/2018). Instrucciones para el Paciente Dieta restringida en purinas: Instrucciones de cuidado - [ Purine-Restricted Diet: Care Instructions ] Instrucciones de cuidado Brian Heath son sustancias que se encuentran en ciertos alimentos. Timmons cuerpo transforma las purinas en ácido úrico. Un nivel alto de ácido úrico puede causar gota, bailey forma de artritis que provoca dolor e inflamación en las articulaciones. Usted podría ayudar a controlar la cantidad de ácido úrico en timmons cuerpo limitando los alimentos con alto contenido en purinas de timmons Garcia Quang. La atención de seguimiento es bailey parte clave de timmons tratamiento y seguridad. Asegúrese de hacer y acudir a todas las citas, y llame a timmons médico si está teniendo problemas. También es bailey buena idea saber los resultados de los exámenes y mantener bailey lista de los medicamentos que lorenzo. Cómo puede cuidarse en el hogar? · Planifique jevon comidas y refrigerios con alimentos que carlos bajos en purinas y seguros para usted. Estos alimentos incluyen: ¨ Verduras verdes y tomates. Sarah Mares. Daina Spikes, arroz y cereales integrales. ¨ Huevos, mantequilla de cacahuate (maní) y nueces. José Luis Gravely, queso y otros productos lácteos semidescremados. ¨ Palomitas de maíz (\"popcorn\"). ¨ Postres de gelatina, chocolate, cacao, así fantasma tartas y dulces en pequeñas cantidades. · Puede comer alimentos que carlos medianamente ricos en purinas, katelin solo de vez en cuando. Estos alimentos incluyen: ¨ Legumbres, fantasma frijoles (habichuelas) secos y arvejas (chícharos) secas. Puede comer 1 taza de legumbres cocidas al día. ¨ Espárragos, coliflor, espinacas, hongos y arvejas (chícharos). ¨ Pescados y mariscos (que no carlos mariscos muy ricos en purinas). ¨ Obinna, salvado de yaya y germen de [place]. · Limite los alimentos muy ricos en purinas fantasma: 
¨ Vísceras, fantasma hígado, riñones, mollejas y sesos. ¨ Shabbir, incluyendo tocino, res, cerdo y gomez. ¨ Shabbir de animales de caza y cualquier otro tipo de carne en grandes cantidades. ¨ Anchoas, savanna, arenque, caballa y vieiras. ¨ Salsa de carne (\"gravy\"). ¨ Cerveza. Dónde puede encontrar más información en inglés? Waleska Officer a http://wen-laek.info/. Ramona F448 en la búsqueda para aprender más acerca de \"Dieta restringida en purinas: Instrucciones de cuidado - [ Purine-Restricted Diet: Care Instructions ]. \" 
Revisado: 12 mayo, 2017 Versión del contenido: 11.4 © 8186-0021 Healthwise, Incorporated. Las instrucciones de cuidado fueron adaptadas bajo licencia por [employer] (which disclaims liability or warranty for this information). Si usted tiene [place] afección médica o sobre estas instrucciones, siempre pregunte a rhodes profesional de filomena. Healthwise, Incorporated niega toda garantía o responsabilidad por rhodes uso de esta información. Introducing [facility]! Estimado Roberto : 
Ciara por solicitar bailey cuenta de Toni singh. Nuestros registros indican que usted ya tiene bailey cuenta Toni [place]. Usted puede acceder a rhodes cuenta en cualquier momento en [email or web address]. [employer]. Leadjini/mychart Sabía usted que usted puede acceder a rhodes hospital y las instrucciones de suki ER en cualquier momento en MyChart ? También puede revisar [employer] de las pruebas de rhodes hospitalización o visita a urgencias . Información Adicional 
 
Si tiene alguna pregunta , por favor visite la sección de preguntas frecuentes del sitio web MyChart en [email or web address]. [email or web address] . Recuerde, MyChart NO es que se utilizará para las necesidades urgentes. Para emergencias médicas , llame al 911 . Ahora disponible en rhodes iPhone y Android ! Por favor proporcione demetria resumen de la documentación de cuidado a rhodes próximo proveedor. Your primary care clinician is listed as Ceasar Apley. If you have any questions after today's visit, please call [phone].

## 2018-02-01 NOTE — PROGRESS NOTES
Subjective:     Chief Complaint   Patient presents with    Medication Refill    Knee Pain        He  is a 65 y.o. male who presents for evaluation of HTN and knee pain. Pt has been out of HTN Rx x 1 year. Has been on same dose of Lisinopril and synthroid x 3-4 years. No recent dose adjustments. Takes indocin PRN 2-3x week for knee pain. Pain usually in L knee. Works in construction/ceiling work, and is up and down ladders frequently. Has noted in the last year, Pt has been more concerned and stressed about his missing L ring finger   R/t avoiding groups, low energy, etc.   Is requesting eval for CBT/counseling since he recognizes his reaction/stress is not normal.   Original injury/limb loss > 35 years ago. Occasional etoh use, no drugs. No SI/HI. Flu vaccine UTD. ROS  Gen - no fever/chills  Resp - no dyspnea or cough  CV - no chest pain or ROMERO  Rest per HPI    Past Medical History:   Diagnosis Date    Essential hypertension 7/10/2015    GERD without esophagitis     Gout 2000    Hypertension     Hypothyroidism, adult 7/10/2015    Thyroid disease     Hypothyroidism     No past surgical history on file. Current Outpatient Prescriptions on File Prior to Visit   Medication Sig Dispense Refill    polyethylene glycol (MIRALAX) 17 gram/dose powder Take 17 g by mouth daily. 510 g 2    levothyroxine (SYNTHROID) 50 mcg tablet Take 1 Tab by mouth Daily (before breakfast). 90 Tab 3    lisinopril (PRINIVIL, ZESTRIL) 20 mg tablet Take 1 Tab by mouth daily. 90 Tab 3    omeprazole (PRILOSEC) 40 mg capsule Take 1 Cap by mouth Daily (before breakfast). 90 Cap 1    indomethacin (INDOCIN) 50 mg capsule Take 1 Cap by mouth three (3) times daily. 30 Cap 3    probenecid (BENEMID) 500 mg tablet Take 0.5 Tabs by mouth two (2) times a day. For gout prevention. After 1 week, increase to 500 mg twice daily. 60 Tab 5     No current facility-administered medications on file prior to visit. Objective:     Vitals:    02/01/18 1144 02/01/18 1150   BP: 155/90 134/88   Pulse: 84 78   Temp: 97.7 °F (36.5 °C)    TempSrc: Oral    Weight: 227 lb (103 kg)    Height: 5' 7.91\" (1.725 m)        Physical Examination:  General appearance - alert, well appearing, and in no distress  Eyes -sclera anicteric  Neck - supple, no significant adenopathy, no thyromegaly  Chest - clear to auscultation, no wheezes, rales or rhonchi, symmetric air entry  Heart - normal rate, regular rhythm, normal S1, S2, no murmurs, rubs, clicks or gallops  Neurological - alert, oriented, no focal findings or movement disorder noted  Abdomen-BS present/WNL x 4 quads, non-tender/distended, soft,no organomegaly    Assessment/ Plan:   Diagnoses and all orders for this visit:    1. Essential hypertension  -     METABOLIC PANEL, COMPREHENSIVE; Future  -     URINALYSIS W/ RFLX MICROSCOPIC; Future  -     HEMOGLOBIN A1C WITH EAG; Future  -     MICROALBUMIN, UR, RAND W/ MICROALBUMIN/CREA RATIO; Future  -     lisinopril (PRINIVIL, ZESTRIL) 20 mg tablet; Take 1 Tab by mouth daily. 2. Acquired hypothyroidism  -     levothyroxine (SYNTHROID) 50 mcg tablet; Take 1 Tab by mouth Daily (before breakfast). -     TSH 3RD GENERATION; Future    3. Chronic gout of left knee, unspecified cause  -     indomethacin (INDOCIN) 50 mg capsule; Take 1 Cap by mouth three (3) times daily as needed. -     probenecid (BENEMID) 500 mg tablet; Take 1 Tab by mouth two (2) times a day. For gout prevention.  -     diclofenac (VOLTAREN) 1 % gel; Apply 2 g to affected area four (4) times daily. Refill current Rx. Check baseline labs. Discussed diet changes r/t gout. Attempt trial of voltaren gel r/t gout and suspected OA pain. Refer to George Yang r/t concerns over finger, grief/loss and/or some recent provoking incident that has caused Pt to have concerns r/t finger. Re-eval in 2-3 months.      Change POC if labs abnormal.      I have discussed the diagnosis with the patient and the intended plan as seen in the above orders. The patient has received an after-visit summary and questions were answered concerning future plans. I have discussed medication side effects and warnings with the patient as well. The patient verbalizes understanding and agreement with the plan.     Follow-up Disposition: Not on File

## 2018-02-01 NOTE — PROGRESS NOTES
AVS printed and reviewed. E scripts discussed. Good Rx coupons done for all meds except Lisinopril and levothyroxine. Indomethacin cost $ 8.71, Benemid $23.51, Voltaren gel $22.02. Appts discussed at registration. Declined flu vaccine today \"had in Sept 2017. \"

## 2018-02-01 NOTE — PATIENT INSTRUCTIONS
Dieta restringida en purinas: Instrucciones de cuidado - [ Purine-Restricted Diet: Care Instructions ]  Instrucciones de [address] son sustancias que se encuentran en ciertos alimentos. Rhodes cuerpo transforma las purinas en ácido úrico. Un nivel alto de ácido úrico puede causar gota, bailey forma de artritis que provoca dolor e inflamación en las articulaciones. Usted podría ayudar a controlar la cantidad de ácido úrico en rhodes cuerpo limitando los alimentos con alto contenido en purinas de rhodes Daniella Dominick. La atención de seguimiento es bailey parte clave de rhodes tratamiento y seguridad. Asegúrese de hacer y acudir a todas las citas, y llame a rhodes médico si está teniendo problemas. También es bailey buena idea saber los resultados de los exámenes y mantener bailey lista de los medicamentos que lorenzo. ¿Cómo puede cuidarse en el hogar? · Planifique jevon comidas y refrigerios con alimentos que carlos bajos en purinas y seguros para usted. Estos alimentos incluyen:  ¨ Verduras verdes y tomates. Genevie Primrose Genora Saas. Dorene Better, arroz y cereales integrales. ¨ Huevos, mantequilla de cacahuate (maní) y nueces. Edmonia Gianotti, queso y otros productos lácteos semidescremados. ¨ Palomitas de maíz (\"popcorn\"). ¨ Postres de gelatina, chocolate, cacao, así fantasma tartas y dulces en pequeñas cantidades. · Puede comer alimentos que carlos medianamente ricos en purinas, katelin solo de vez en cuando. Estos alimentos incluyen:  ¨ Legumbres, fantasma frijoles (habichuelas) secos y arvejas (chícharos) secas. Puede comer 1 taza de legumbres cocidas al día. ¨ Espárragos, coliflor, espinacas, hongos y arvejas (chícharos). ¨ Pescados y mariscos (que no carlos mariscos muy ricos en purinas). ¨ Obinna, salvado de yaya y germen de [place]. · Limite los alimentos muy ricos en purinas fantasma:  ¨ Vísceras, fantasma hígado, riñones, mollejas y sesos. ¨ Shabbir, incluyendo tocino, res, cerdo y gomez.   ¨ Shabbir de animales de caza y cualquier otro tipo de carne en grandes cantidades. ¨ Anchoas, savanna, emmaque, cabgerrya y vieiras. ¨ Salsa de carne (\"gravy\"). ¨ Cerveza. ¿Dónde puede encontrar más información en inglés? Star Presume a http://wen-alek.info/. Escriba F448 en la búsqueda para aprender más acerca de \"Dieta restringida en purinas: Instrucciones de cuidado - [ Purine-Restricted Diet: Care Instructions ]. \"  Revisado: 12 velazco, 2017  Versión del contenido: 11.4  © 1000-7701 Healthwise, Incorporated. Las instrucciones de cuidado fueron adaptadas bajo licencia por Good Help Connections (which disclaims liability or warranty for this information). Si usted tiene [place] afección médica o sobre estas instrucciones, siempre pregunte a rhodes profesional de filomena. Healthwise, Incorporated niega toda garantía o responsabilidad por rhodes uso de esta información.

## 2018-02-02 LAB
ALBUMIN SERPL-MCNC: 4.3 G/DL (ref 3.5–5)
ALBUMIN/GLOB SERPL: 1.3 {RATIO} (ref 1.1–2.2)
ALP SERPL-CCNC: 59 U/L (ref 45–117)
ALT SERPL-CCNC: 86 U/L (ref 12–78)
ANION GAP SERPL CALC-SCNC: 9 MMOL/L (ref 5–15)
APPEARANCE UR: CLEAR
AST SERPL-CCNC: 48 U/L (ref 15–37)
BILIRUB SERPL-MCNC: 0.8 MG/DL (ref 0.2–1)
BILIRUB UR QL: NEGATIVE
BUN SERPL-MCNC: 14 MG/DL (ref 6–20)
BUN/CREAT SERPL: 14 (ref 12–20)
CALCIUM SERPL-MCNC: 9.4 MG/DL (ref 8.5–10.1)
CHLORIDE SERPL-SCNC: 103 MMOL/L (ref 97–108)
CO2 SERPL-SCNC: 25 MMOL/L (ref 21–32)
COLOR UR: NORMAL
CREAT SERPL-MCNC: 0.98 MG/DL (ref 0.7–1.3)
CREAT UR-MCNC: 111 MG/DL
EST. AVERAGE GLUCOSE BLD GHB EST-MCNC: 105 MG/DL
GLOBULIN SER CALC-MCNC: 3.4 G/DL (ref 2–4)
GLUCOSE SERPL-MCNC: 90 MG/DL (ref 65–100)
GLUCOSE UR STRIP.AUTO-MCNC: NEGATIVE MG/DL
HBA1C MFR BLD: 5.3 % (ref 4.2–6.3)
HGB UR QL STRIP: NEGATIVE
KETONES UR QL STRIP.AUTO: NEGATIVE MG/DL
LEUKOCYTE ESTERASE UR QL STRIP.AUTO: NEGATIVE
MICROALBUMIN UR-MCNC: <0.5 MG/DL
MICROALBUMIN/CREAT UR-RTO: NORMAL MG/G (ref 0–30)
NITRITE UR QL STRIP.AUTO: NEGATIVE
PH UR STRIP: 6.5 [PH] (ref 5–8)
POTASSIUM SERPL-SCNC: 4.3 MMOL/L (ref 3.5–5.1)
PROT SERPL-MCNC: 7.7 G/DL (ref 6.4–8.2)
PROT UR STRIP-MCNC: NEGATIVE MG/DL
SODIUM SERPL-SCNC: 137 MMOL/L (ref 136–145)
SP GR UR REFRACTOMETRY: 1.01 (ref 1–1.03)
TSH SERPL DL<=0.05 MIU/L-ACNC: 1.44 UIU/ML (ref 0.36–3.74)
UROBILINOGEN UR QL STRIP.AUTO: 0.2 EU/DL (ref 0.2–1)

## 2018-04-02 ENCOUNTER — OFFICE VISIT (OUTPATIENT)
Dept: FAMILY MEDICINE CLINIC | Age: 58
End: 2018-04-02

## 2018-04-02 VITALS
HEART RATE: 70 BPM | WEIGHT: 226 LBS | BODY MASS INDEX: 34.45 KG/M2 | SYSTOLIC BLOOD PRESSURE: 118 MMHG | TEMPERATURE: 98.2 F | DIASTOLIC BLOOD PRESSURE: 76 MMHG

## 2018-04-02 DIAGNOSIS — H04.129 DRY EYE: ICD-10-CM

## 2018-04-02 DIAGNOSIS — H57.89 IRRITATION OF BOTH EYES: Primary | ICD-10-CM

## 2018-04-02 NOTE — PROGRESS NOTES
Coordination of Care  1. Have you been to the ER, urgent care clinic since your last visit? Hospitalized since your last visit? Yes When: hospital in [facility], 03/2018, for elevated blood pressure    2. Have you seen or consulted any other health care providers outside of the [facility] since your last visit? Include any pap smears or colon screening. Yes When: dr.Walter Brandon, 3/2018, for f/u lab work    Does the patient need refills? NO    Learning Assessment Complete?  yes

## 2018-04-02 NOTE — MR AVS SNAPSHOT
[address] [place] [address] [address] [facility] 
[phone] Patient: Manish Watson MRN: [id number] URY:[id number] Visit Information [facility] Personal Médico Departamento Teléfono del Dep. Número de visita 4/2/2018 10:00 AM Yanelis Guaman MD [address] [phone] Follow-up Instructions Return if symptoms worsen or fail to improve. Follow-up and Disposition History Upcoming Health Maintenance Date Due DTaP/Tdap/Td series (1 - Tdap) 3/4/1982 FOBT Q 1 YEAR AGE 50-75 3/4/2011 Alergias  Review Complete El: 4/2/2018 Por: Yanelis Guaman MD  
 A partir del:  4/2/2018 Intensidad Anotado Tipo de reacción [facility] Allopurinol  03/19/2013    Rash Vacunas actuales Charles Smart Samuel Influenza Vaccine 3/19/2015 Influenza Vaccine (Quad) PF 9/15/2017, 8/30/2016, 10/21/2015 No revisadas esta visita You Were Diagnosed With   
  
 Tanda Cue Irritation of both eyes    -  Primary ICD-10-CM: H57.8 ICD-9-CM: 379.99 Dry eye     ICD-10-CM: H46.536 ICD-9-CM: 375.15 Partes vitales PS Pulso Temperatura Peso (percentil de crecimiento) BMI ([facility]) Estatus de tabaquísmo 118/76 (BP 1 Location: Left arm, BP Patient Position: Sitting) 70 98.2 °F (36.8 °C) (Oral) 226 lb (102.5 kg) 34.45 kg/m2 Never Smoker Historial de signos vitales BMI and BSA Data Body Mass Index Body Surface Area 34.45 kg/m 2 2.22 m 2 [facility] Name Phone [address] [facility], [address] [place] [address] [address] [facility] [phone] Timmons lista de medicamentos actualizada Qing Munson actualizada 4/2/18 12:18 PM.  Sydell Simmonds use timmons lista de medicamentos más reciente. diclofenac 1 % Gel También conocido fantasma:  VOLTAREN  
 Apply 2 g to affected area four (4) times daily. indomethacin 50 mg capsule También conocido fantasma:  INDOCIN Take 1 Cap by mouth three (3) times daily as needed. levothyroxine 50 mcg tablet También conocido fantasma:  SYNTHROID Take 1 Tab by mouth Daily (before breakfast). lisinopril 20 mg tablet También conocido fantasma:  Ron Boehringer Take 1 Tab by mouth daily. naphazoline 0.1 % ophthalmic solution También conocido fantasma:  NAPHCON Administer 2 Drops to both eyes four (4) times daily as needed. Coloque 2 gotas a los ojos cada 6 horas si necesita para dolor, iritacion  
  
 probenecid 500 mg tablet También conocido fantasma:  BENEMID Take 1 Tab by mouth two (2) times a day. For gout prevention. Recetas Enviado a la Snow Refills  
 naphazoline (NAPHCON) 0.1 % ophthalmic solution 1 Sig: Administer 2 Drops to both eyes four (4) times daily as needed. Coloque 2 gotas a los ojos cada 6 horas si necesita para dolor, iritacion Class: Normal  
 Pharmacy: [facility] [address], [address] Ph #: [phone] Route: Both Eyes Instrucciones de seguimiento Return if symptoms worsen or fail to improve. Instrucciones para el Paciente Sequedad de ojos: Instrucciones de cuidado - [ Dry Eyes: Care Instructions ] Instrucciones de cuidado Los ojos secos pueden ser incómodos. La sequedad puede hacer que mary ojos se sientan secos o calientes. Mary ojos también podrían estar muy llorosos. En algunos casos, tener ojos secos puede hacer que parezca fantasma si hubiera arena o pancho en ellos. De vez en cuando, los ojos secos pueden hacer que usted tenga la visión borrosa. Tanner los ojos secos no suelen causar problemas de la vista a ryder plazo. Hay muchas causas de la sequedad de ojos. A veces, el clima seco, el humo o la contaminación pueden causar Mattel ojos.  Otras veces, las alergias o los lentes de contacto irritan los ojos. Las personas mayores suelen tener secos los ojos porque nuestros ojos no producen tantas lágrimas según envejecemos. En algunos casos, las enfermedades pueden causar Rosas Grider. Estas incluyen la artritis reumatoide, el lupus y el síndrome de Sjögren. En otros casos, los medicamentos son la causa. Rhodes médico podría hacerle pruebas para ayudar a encontrar la causa de rhodes sequedad de ojos. Usted puede colaborar con rhodes médico para encontrar maneras de ayudar a que jevon ojos se sientan mejor. El tratamiento en el hogar suele ayudar. La atención de seguimiento es bailey parte clave de rhodes tratamiento y seguridad. Asegúrese de hacer y acudir a todas las citas, y llame a rhodes médico si está teniendo problemas. También es bailey buena idea saber los resultados de los exámenes y mantener bailey lista de los medicamentos que lorenzo. Cómo puede cuidarse en el hogar? · Paulina pausas frecuentes cuando ronel, jerry televisión o use la computadora. Cierre los ojos. No se frote los ojos. Las lágrimas artificiales podrían ser de ayuda cuando usted paulina estas actividades. Se pueden comprar sin receta médica. · Evite el humo y otras cosas que irriten jevon ojos. · Use lentes de sol que envuelvan los lados de la marlys. Pueden proteger los ojos del sol, el viento, el polvo y la [place]. · Utilice un vaporizador o humidificador para añadir humedad al aire de rhodes dormitorio. Siga las instrucciones para limpiar el aparato. · No use ventiladores mientras duerme. · Si utiliza lentes de contacto con frecuencia, use gotas humectantes o anteojos hasta que jevon ojos se sientan mejor. · Sea ashwin con los medicamentos. [place] jevon medicamentos exactamente fantasma le fueron recetados. Llame a rhodes médico si ana maria estar teniendo problemas con rhodes medicamento. · Pruebe a usar lágrimas artificiales al menos 4 veces al día.  
· Si necesita gotas más de 4 veces al día, use lágrimas artificiales sin conservantes. Es posible que irriten [employer] ojos. · Use bailey pomada o un gel lubricante para los ojos antes de WEDGECARRUP. Estos son Manish Shannon y felix [place], así que podría tener menos ardor, sequedad y comezón cuando se despierte. Tenga presente que podrían causarle visión borrosa por un tiempo breve. · Para ponerse pomada o gotas para los ojos: ¨ Incline la marlys hacia atrás y, con un dedo, baje el párpado inferior. ¨ Deje caer las gotas o un chorrito del medicamento dentro del párpado inferior. ¨ Cierre el emanuel matt 30 a 61 segundos para permitir que las gotas o la pomada se esparzan. ¨ No permita que la punta del tubo de [place] o del gotero toque mary pestañas ni ninguna otra superficie. · Colóquese un paño húmedo y tibio en los párpados todas las mañanas matt unos 5 minutos. Después, masajee los párpados ligeramente. [place] ayuda a aumentar la humectación natural de mary ojos. Cuándo debe pedir ayuda? Preste especial atención a los cambios en rhodes filomena y asegúrese de comunicarse con rhodes médico si: 
? · Mary ojos siguen secos, irritados o llorosos, y las lágrimas artificiales no ayudan. ? · No mejora fantasma se esperaba. Dónde puede encontrar más información en inglés? Wilmar Denis a http://wen-alek.info/. Escriba D231 en la búsqueda para aprender más acerca de \"Sequedad de ojos: Instrucciones de cuidado - [ Dry Eyes: Care Instructions ]. \" 
Revisado: 3 marzo, 2017 Versión del contenido: 11.4 © 7054-5588 Healthwise, Incorporated. Las instrucciones de cuidado fueron adaptadas bajo licencia por Good Help Connections (which disclaims liability or warranty for this information). Si usted tiene [place] afección médica o sobre estas instrucciones, siempre pregunte a rhodes profesional de filomena. [employer]itro, [employer] niega toda garantía o responsabilidad por rhodes uso de esta información. Introducing [facility] & HEALTH SERVICES!    
  
Rayray Belt : 
 Ciara por solicitar bailey cuenta de MyChart usted. Nuestros registros indican que usted ya tiene bailey cuenta [employer] [place]. Usted puede acceder a rhodes cuenta en cualquier momento en [email or web address]. [employer]. [employer]/mychart Sabía usted que usted puede acceder a rhodes hospital y las instrucciones de suki ER en cualquier momento en MyChart ? También puede revisar [facility] de las pruebas de rhodes hospitalización o visita a urgencias . Información Adicional 
 
Si tiene alguna pregunta , por favor visite la sección de preguntas frecuentes del sitio web MyChart en [email or web address] [employer]. [email or web address] . Recuerde, MyChart NO es que se utilizará para las necesidades urgentes. Para emergencias médicas , llame al 911 . Ahora disponible en rhodes iPhone y Android ! Por favor proporcione demetria resumen de la documentación de cuidado a rhodes próximo proveedor. Your primary care clinician is listed as Agustín Thorpe. If you have any questions after today's visit, please call [phone].

## 2018-04-02 NOTE — PROGRESS NOTES
Corinne Sievert is a 63 y.o. male    Issues discussed today include:    Chief Complaint   Patient presents with    Eye Problem     pt. c/o dryness to both eyes, worse in r/eye. 1) Dry eyes:  Started 2 yrs ago. [facility] At those times had redness around his eyes to lids and pain inside the eye. Pt is not sure if this is something serious or not. Eyes feel very dry, at night has a hard time opening them due to dryness. In the am the R>L is red. At other times, he leaks excess tears. Off and on blurry vision. [facility], but not helping. When he moves eyes side to side feels like sand in them. Works in construction. Data reviewed or ordered today:       Other problems include:  Patient Active Problem List   Diagnosis Code    Gout M10.9    Hypothyroidism E03.9    Essential hypertension I10    FHx: diabetes mellitus Z83.3    Encounter for long-term (current) use of medications Z79.899    Transaminitis R74.0       Medications:  Current Outpatient Prescriptions   Medication Sig Dispense Refill    naphazoline (NAPHCON) 0.1 % ophthalmic solution Administer 2 Drops to both eyes four (4) times daily as needed. Coloque 2 gotas a los ojos cada 6 horas si necesita para dolor, iritacion 15 mL 1    levothyroxine (SYNTHROID) 50 mcg tablet Take 1 Tab by mouth Daily (before breakfast). 90 Tab 3    indomethacin (INDOCIN) 50 mg capsule Take 1 Cap by mouth three (3) times daily as needed. 30 Cap 3    lisinopril (PRINIVIL, ZESTRIL) 20 mg tablet Take 1 Tab by mouth daily. 90 Tab 3    probenecid (BENEMID) 500 mg tablet Take 1 Tab by mouth two (2) times a day. For gout prevention. 60 Tab 5    diclofenac (VOLTAREN) 1 % gel Apply 2 g to affected area four (4) times daily. 100 g 2       Allergies: Allergies   Allergen Reactions    Allopurinol Rash       LMP:  No LMP for male patient.     Social History     Social History    Marital status:      Spouse name: N/A    Number of children: N/A    Years of education: N/A     Occupational History    Not on file. Social History Main Topics    Smoking status: Never Smoker    Smokeless tobacco: Never Used    Alcohol use 0.0 oz/week     0 Standard drinks or equivalent per week      Comment: Ocassional    Drug use: No    Sexual activity: Yes     Partners: Female     Birth control/ protection: None     Other Topics Concern    Not on file     Social History Narrative    ** Merged History Encounter **            Family History   Problem Relation Age of Onset    Hypertension Mother     Diabetes Mother      He thinks she had when older    Cancer Father 76     lung (non-smoker)         Physical Exam   Visit Vitals    /76 (BP 1 Location: Left arm, BP Patient Position: Sitting)    Pulse 70    Temp 98.2 °F (36.8 °C) (Oral)    Wt 226 lb (102.5 kg)    BMI 34.45 kg/m2      BP Readings from Last 3 Encounters:   04/02/18 118/76   02/01/18 134/88   09/15/17 151/73     Constitutional: Appears well,  No acute distress, Vitals noted  Psychiatric:  Affect normal, Alert and Oriented to person/place/time  Eyes:  Conjunctiva clear, no drainage. PERRL, EOMI without pain, slight clouding of the pupil bilat  ENT:  External ears and nose normal, Mucous membranes moist  Neck:  General inspection normal. Supple. Heart:  Normal HR, Normal S1 and S2,  Regular rhythm. No murmurs, rubs or gallops. Lungs:  Clear to auscultation, good respiratory effort, no wheezes, rales or rhonchi  Skin:  Warm to palpation, without rashes, no erythema or edema to eye lids      Assessment/Plan:      ICD-10-CM ICD-9-CM    1. Irritation of both eyes H57.8 379.99    2.  Dry eye H04.129 375.15        Eye irritation, bilat with R>L - possible allergies conjunctivitis vs dry eyes vs vision defect  - Nurse to please given optometry resource page  - Citlali Sousa pt to bring results back to us or have [facility]ometris fax results to us  - If needed after opto appt, can refer to ophthalmology  - Rx for naphazolin eye gtt for now  - Can also use artificial tears to help with dryness sensation    UTD on flu and Tdap vaccines      Follow-up Disposition:  Return if symptoms worsen or fail to improve.         Deric Metcalf MD  [address] [place] [address]

## 2018-04-02 NOTE — PATIENT INSTRUCTIONS
Sequedad de ojos: Instrucciones de cuidado - [ Dry Eyes: Care Instructions ]  Instrucciones de cuidado    Los ojos secos pueden ser incómodos. La sequedad puede hacer que jevon ojos se sientan secos o calientes. Jevon ojos también podrían estar muy llorosos. En algunos casos, tener ojos secos puede hacer que parezca fantasma si hubiera arena o pancho en ellos. De vez en cuando, los ojos secos pueden hacer que usted tenga la visión borrosa. Tanner los ojos secos no suelen causar problemas de la vista a ryder plazo. Hay muchas causas de la sequedad de ojos. A veces, el clima seco, el humo o la contaminación pueden causar Mattel ojos. Otras veces, las alergias o los lentes de contacto irritan los ojos. Las personas mayores suelen tener secos los ojos porque nuestros ojos no producen tantas lágrimas según envejecemos. En algunos casos, las enfermedades pueden causar Rosas Grider. Estas incluyen la artritis reumatoide, el lupus y el síndrome de Sjögren. En otros casos, los medicamentos son la causa. Rhodes médico podría hacerle pruebas para ayudar a encontrar la causa de rhodes sequedad de ojos. Usted puede colaborar con rhodes médico para encontrar maneras de ayudar a que jevon ojos se sientan mejor. El tratamiento en el hogar suele ayudar. La atención de seguimiento es bailey parte clave de rhodes tratamiento y seguridad. Asegúrese de hacer y acudir a todas las citas, y llame a rhodes médico si está teniendo problemas. También es bailey buena idea saber los resultados de los exámenes y mantener bailey lista de los medicamentos que lorenzo. ¿Cómo puede cuidarse en el hogar? · Paulina pausas frecuentes cuando ronel, jerry televisión o use la computadora. Cierre los ojos. No se frote los ojos. Las lágrimas artificiales podrían ser de ayuda cuando usted paulina estas actividades. Se pueden comprar sin receta médica. · Evite el humo y otras cosas que irriten jevon ojos. · Use lentes de sol que envuelvan los lados de la marlys.  Pueden proteger los ojos del sol, el viento, el polvo y la pancho. · Utilice un vaporizador o humidificador para añadir humedad al aire de rhodes dormitorio. Siga las instrucciones para limpiar el aparato. · No use ventiladores mientras duerme. · Si utiliza lentes de contacto con frecuencia, use gotas humectantes o anteojos hasta que mary ojos se sientan mejor. · Sea ashwin con los medicamentos. [place] mary medicamentos exactamente fantasma le fueron recetados. Llame a rhodes médico si ana maria estar teniendo problemas con rhodes medicamento. · Pruebe a usar lágrimas artificiales al menos 4 veces al día. · Si necesita gotas más de 4 veces al día, use lágrimas artificiales sin conservantes. Es posible que irriten [employer] ojos. · Use bailey pomada o un gel lubricante para los ojos antes de WEDGECARRUP. Estos son Eudelia Lei y felix [place], así que podría tener menos ardor, sequedad y comezón cuando se despierte. Tenga presente que podrían causarle visión borrosa por un tiempo breve. · Para ponerse pomada o gotas para los ojos:  ¨ Incline la [place] atrás y, con un dedo, baje el párpado inferior. ¨ Deje caer las gotas o un chorrito del medicamento dentro del párpado inferior. ¨ Cierre el emanuel matt 30 a 61 segundos para permitir que las gotas o la pomada se esparzan. ¨ No permita que la punta del tubo de [place] o del gotero toque mary pestañas ni ninguna otra superficie. · Colóquese un paño húmedo y tibio en los párpados todas las mañanas matt unos 5 minutos. Después, masajee los párpados ligeramente. [place] ayuda a aumentar la humectación natural de mary ojos. ¿Cuándo debe pedir ayuda? Preste especial atención a los cambios en rhodes filomena y asegúrese de comunicarse con rhodes médico si:  ? · Mary ojos siguen secos, irritados o llorosos, y las lágrimas artificiales no ayudan. ? · No mejora fantasma se esperaba. ¿Dónde puede encontrar más información en inglés? [place] a http://wen-alek.info/.   Escriba D231 en la búsqueda para aprender más acerca de \"Sequedad de ojos: Instrucciones de cuidado - [ Dry Eyes: Care Instructions ]. \"  Revisado: 3 Kishan Heal 2017  Versión del contenido: 11.4  © 1074-1559 Healthwise, [employer]. Las instrucciones de cuidado fueron adaptadas bajo licencia por Good Help Connections (which disclaims liability or warranty for this information). Si usted tiene [place] afección médica o sobre estas instrucciones, siempre pregunte a rhodes profesional de filomena. [employer], [employer] niega toda garantía o responsabilidad por rhodes uso de esta información.

## 2018-04-02 NOTE — PROGRESS NOTES
AVS printed and reviewed. Given and discussed eye resources resources form. Discussed \"must pay for eye exam\". Encouraged to have exam notes Faxed to [facility] office. Good Rx coupon [facility] . Cost $7.40. F/u as needed.

## 2018-04-28 DIAGNOSIS — M1A.0620 CHRONIC GOUT OF LEFT KNEE, UNSPECIFIED CAUSE: ICD-10-CM

## 2018-04-28 NOTE — LETTER
5/4/2018 8:21 AM 
 
. [address]. [address] No refill on Indocin at this time. Please return for follow up with a medical provider at the [place] [address] per Dr Leslie Limon. May take a medicine like Naproxen 220-440 mg twice daily. Please call the [place] [address] office nurse to discuss this issue and for any questions/problems. [phone] We have tried to reach you by phone. [address] [place]:  
 
Por favor regrese a Aparna Willows. Por favor recordar que Usted tiene que llegar temprano para hacer la línea para que pueda ser vista el mismo josse. Llame a [address] @ [phone]. Please come back to the [address] [place] [address]. Please remember that you need to arrive early enough to make the line to be seen on the day that you return. Sincerely, Rakel Quarles RN for Saadia Stone NP

## 2018-04-30 RX ORDER — INDOMETHACIN 50 MG/1
CAPSULE ORAL
Qty: 30 CAP | Refills: 3 | OUTPATIENT
Start: 2018-04-30

## 2018-04-30 NOTE — TELEPHONE ENCOUNTER
I did not talk to him about gout, but I would not recommend indocin multiple times weekly as per doc in [facility]  Pt can take milder NSAID like naproxen 220-440mg bid prn instead and save indomethacin for true gout flare  If he has frequent gout flares, we can adjust his preventative med  Thanks, YORDY

## 2018-05-02 NOTE — TELEPHONE ENCOUNTER
Telephone call made to the patient with assistance from [employer]  # [phone] to review the declined prescription refill and recommendation for OTC Aleve. There was no answer at his home/cell phone number so a generic message was left to please call the [facility] office.  Sarita Crowley RN

## 2018-05-04 NOTE — TELEPHONE ENCOUNTER
Attempt call, no answer. General phone message left re medicine and to call [facility] office nurse. Letter done.

## 2019-03-18 DIAGNOSIS — I10 ESSENTIAL HYPERTENSION: ICD-10-CM

## 2019-03-18 RX ORDER — LISINOPRIL 20 MG/1
20 TABLET ORAL DAILY
Qty: 30 TAB | Refills: 2 | Status: SHIPPED | OUTPATIENT
Start: 2019-03-18 | End: 2019-08-07 | Stop reason: SDUPTHER

## 2019-03-18 NOTE — LETTER
3/20/2019 11:26 AM 
 
. [address] 2. [address] A refill was sent to your [facility] pharmacy for Lisinopril 20 mg take 1 tab daily on 3/18/19. Please return to the [place] [address] for a medical follow up visit. You may call the office to schedule an appointment @[phone] or \"make the line \" to see a medical provider. The line starts prior to the clinic start. We have tried to reach you by phone. Sincerely, Zoran Leonard RN for Esme Espino MD

## 2019-03-18 NOTE — TELEPHONE ENCOUNTER
Received faxed refill request from pt's pharmacy for Lisinopril. Routing to Dr. Natalie East for review. Pt's last office visit was 4/2/18 with Dr. Natalie East. RX was originally filled for one year by Nader Bills. Maribel Dey RN

## 2019-03-18 NOTE — TELEPHONE ENCOUNTER
Rx for lisinopril 20mg daily sent for 30 days with 2 refills, note on rx that pt needs to be seen in clinic before any further rx sent.  If no availability, pt may need to walk in, perhaps to one of the less busy sites

## 2019-03-20 NOTE — TELEPHONE ENCOUNTER
General message left to call [facility] office nurse about refill sent to pharmacy on 3/18/19 and need to discuss f/u plans. Letter also done.

## 2019-07-08 DIAGNOSIS — I10 ESSENTIAL HYPERTENSION: ICD-10-CM

## 2019-07-08 RX ORDER — LISINOPRIL 20 MG/1
20 TABLET ORAL DAILY
Qty: 30 TAB | Refills: 2 | OUTPATIENT
Start: 2019-07-08

## 2019-07-08 NOTE — TELEPHONE ENCOUNTER
Received faxed refill request from pt's pharmacy for Prinivil 20 mg tab. Faxed back denial with message to pharmacy that pt must return to be seen. Dr. Claire Alonso refilled this for 3 month supply in March with message that pt must return for provider follow up for further refills. Pt's last office visit was 4/2/18. Alex Estrada RN  .

## 2019-08-07 ENCOUNTER — OFFICE VISIT (OUTPATIENT)
Dept: FAMILY MEDICINE CLINIC | Age: 59
End: 2019-08-07

## 2019-08-07 ENCOUNTER — HOSPITAL ENCOUNTER (OUTPATIENT)
Dept: LAB | Age: 59
Discharge: HOME | End: 2019-08-07

## 2019-08-07 VITALS
WEIGHT: 236 LBS | TEMPERATURE: 97.5 F | DIASTOLIC BLOOD PRESSURE: 88 MMHG | SYSTOLIC BLOOD PRESSURE: 134 MMHG | BODY MASS INDEX: 35.77 KG/M2 | HEART RATE: 62 BPM | HEIGHT: 68 IN

## 2019-08-07 DIAGNOSIS — M1A.0620 CHRONIC GOUT OF LEFT KNEE, UNSPECIFIED CAUSE: ICD-10-CM

## 2019-08-07 DIAGNOSIS — I10 ESSENTIAL HYPERTENSION: ICD-10-CM

## 2019-08-07 DIAGNOSIS — E03.9 ACQUIRED HYPOTHYROIDISM: ICD-10-CM

## 2019-08-07 DIAGNOSIS — E66.01 SEVERE OBESITY (HCC): ICD-10-CM

## 2019-08-07 LAB
ALBUMIN SERPL-MCNC: 4.4 G/DL (ref 3.5–5)
ALBUMIN/GLOB SERPL: 1.3 {RATIO} (ref 1.1–2.2)
ALP SERPL-CCNC: 84 U/L (ref 45–117)
ALT SERPL-CCNC: 78 U/L (ref 12–78)
ANION GAP SERPL CALC-SCNC: 6 MMOL/L (ref 5–15)
AST SERPL-CCNC: 49 U/L (ref 15–37)
BASOPHILS # BLD: 0 K/UL (ref 0–0.1)
BASOPHILS NFR BLD: 1 % (ref 0–1)
BILIRUB SERPL-MCNC: 0.4 MG/DL (ref 0.2–1)
BUN SERPL-MCNC: 12 MG/DL (ref 6–20)
BUN/CREAT SERPL: 14 (ref 12–20)
CALCIUM SERPL-MCNC: 9.5 MG/DL (ref 8.5–10.1)
CHLORIDE SERPL-SCNC: 106 MMOL/L (ref 97–108)
CHOLEST SERPL-MCNC: 190 MG/DL
CO2 SERPL-SCNC: 26 MMOL/L (ref 21–32)
COMMENT, HOLDF: NORMAL
CREAT SERPL-MCNC: 0.85 MG/DL (ref 0.7–1.3)
DIFFERENTIAL METHOD BLD: NORMAL
EOSINOPHIL # BLD: 0.1 K/UL (ref 0–0.4)
EOSINOPHIL NFR BLD: 2 % (ref 0–7)
ERYTHROCYTE [DISTWIDTH] IN BLOOD BY AUTOMATED COUNT: 13.2 % (ref 11.5–14.5)
EST. AVERAGE GLUCOSE BLD GHB EST-MCNC: 114 MG/DL
GLOBULIN SER CALC-MCNC: 3.4 G/DL (ref 2–4)
GLUCOSE SERPL-MCNC: 91 MG/DL (ref 65–100)
HBA1C MFR BLD: 5.6 % (ref 4.2–6.3)
HCT VFR BLD AUTO: 47.4 % (ref 36.6–50.3)
HDLC SERPL-MCNC: 39 MG/DL
HDLC SERPL: 4.9 {RATIO} (ref 0–5)
HGB BLD-MCNC: 15.6 G/DL (ref 12.1–17)
IMM GRANULOCYTES # BLD AUTO: 0 K/UL (ref 0–0.04)
IMM GRANULOCYTES NFR BLD AUTO: 0 % (ref 0–0.5)
LDLC SERPL CALC-MCNC: 100.4 MG/DL (ref 0–100)
LIPID PROFILE,FLP: ABNORMAL
LYMPHOCYTES # BLD: 1.4 K/UL (ref 0.8–3.5)
LYMPHOCYTES NFR BLD: 23 % (ref 12–49)
MCH RBC QN AUTO: 30.6 PG (ref 26–34)
MCHC RBC AUTO-ENTMCNC: 32.9 G/DL (ref 30–36.5)
MCV RBC AUTO: 93.1 FL (ref 80–99)
MONOCYTES # BLD: 0.5 K/UL (ref 0–1)
MONOCYTES NFR BLD: 8 % (ref 5–13)
NEUTS SEG # BLD: 4 K/UL (ref 1.8–8)
NEUTS SEG NFR BLD: 66 % (ref 32–75)
NRBC # BLD: 0 K/UL (ref 0–0.01)
NRBC BLD-RTO: 0 PER 100 WBC
PLATELET # BLD AUTO: 185 K/UL (ref 150–400)
PMV BLD AUTO: 11.9 FL (ref 8.9–12.9)
POTASSIUM SERPL-SCNC: 4.3 MMOL/L (ref 3.5–5.1)
PROT SERPL-MCNC: 7.8 G/DL (ref 6.4–8.2)
PSA SERPL-MCNC: 0.6 NG/ML (ref 0.01–4)
RBC # BLD AUTO: 5.09 M/UL (ref 4.1–5.7)
SAMPLES BEING HELD,HOLD: NORMAL
SODIUM SERPL-SCNC: 138 MMOL/L (ref 136–145)
TRIGL SERPL-MCNC: 253 MG/DL (ref ?–150)
TSH SERPL DL<=0.05 MIU/L-ACNC: 0.95 UIU/ML (ref 0.36–3.74)
URATE SERPL-MCNC: 7.9 MG/DL (ref 3.5–7.2)
VLDLC SERPL CALC-MCNC: 50.6 MG/DL
WBC # BLD AUTO: 6 K/UL (ref 4.1–11.1)

## 2019-08-07 PROCEDURE — 84153 ASSAY OF PSA TOTAL: CPT

## 2019-08-07 PROCEDURE — 80061 LIPID PANEL: CPT

## 2019-08-07 PROCEDURE — 80053 COMPREHEN METABOLIC PANEL: CPT

## 2019-08-07 PROCEDURE — 85025 COMPLETE CBC W/AUTO DIFF WBC: CPT

## 2019-08-07 PROCEDURE — 83036 HEMOGLOBIN GLYCOSYLATED A1C: CPT

## 2019-08-07 PROCEDURE — 84550 ASSAY OF BLOOD/URIC ACID: CPT

## 2019-08-07 PROCEDURE — 84443 ASSAY THYROID STIM HORMONE: CPT

## 2019-08-07 RX ORDER — LEVOTHYROXINE SODIUM 50 UG/1
50 TABLET ORAL
Qty: 90 TAB | Refills: 3 | Status: SHIPPED | OUTPATIENT
Start: 2019-08-07 | End: 2020-07-07 | Stop reason: SDUPTHER

## 2019-08-07 RX ORDER — METHYLPREDNISOLONE 4 MG/1
TABLET ORAL
Qty: 1 DOSE PACK | Refills: 2 | Status: SHIPPED | OUTPATIENT
Start: 2019-08-07 | End: 2020-08-14

## 2019-08-07 RX ORDER — LISINOPRIL 20 MG/1
20 TABLET ORAL DAILY
Qty: 90 TAB | Refills: 2 | Status: SHIPPED | OUTPATIENT
Start: 2019-08-07 | End: 2020-07-07 | Stop reason: SDUPTHER

## 2019-08-07 RX ORDER — INDOMETHACIN 50 MG/1
50 CAPSULE ORAL
Qty: 30 CAP | Refills: 3 | Status: SHIPPED | OUTPATIENT
Start: 2019-08-07 | End: 2020-01-08

## 2019-08-07 NOTE — PROGRESS NOTES
HISTORY OF PRESENT ILLNESS  Luis Fernando Wellington is a 63 y.o. male. HPI  Patient states he has had some gout episodes. States He is also out of his blood pressure medication. He also has taken indomethacin for pain and that seems to help. He has tried tylenol but seems to not be enough. Would like thyroid medication refill as well. He was taking allopurinol but he had allergic reaction to it. Review of Systems   Constitutional: Negative for chills, fever, malaise/fatigue and weight loss. Respiratory: Negative for cough, shortness of breath and wheezing. Cardiovascular: Negative for chest pain and palpitations. Gastrointestinal: Negative for diarrhea, heartburn and nausea. Genitourinary: Negative for dysuria, frequency and urgency. Musculoskeletal: Positive for joint pain. Skin: Negative for itching and rash. Neurological: Negative for dizziness, tingling and headaches. Psychiatric/Behavioral: Negative for depression, substance abuse and suicidal ideas. /88 (BP 1 Location: Left arm, BP Patient Position: Sitting)   Pulse 62   Temp 97.5 °F (36.4 °C) (Oral)   Ht 5' 7.91\" (1.725 m)   Wt 236 lb (107 kg)   BMI 35.98 kg/m²   Physical Exam   Constitutional: He is oriented to person, place, and time. He appears well-developed and well-nourished. HENT:   Head: Normocephalic. Right Ear: External ear normal.   Left Ear: External ear normal.   Eyes: Pupils are equal, round, and reactive to light. Conjunctivae and EOM are normal.   Neck: Normal range of motion. Neck supple. No tracheal deviation present. No thyromegaly present. Cardiovascular: Normal rate, regular rhythm and normal heart sounds. No murmur heard. Pulmonary/Chest: Effort normal and breath sounds normal. No respiratory distress. He has no wheezes. He has no rales. Abdominal: Soft. Bowel sounds are normal. He exhibits no distension. There is no tenderness. There is no rebound.    Musculoskeletal: Normal range of motion. He exhibits no edema. Lymphadenopathy:     He has no cervical adenopathy. Neurological: He is alert and oriented to person, place, and time. Skin: Skin is warm. No erythema. ASSESSMENT and PLAN  Diagnoses and all orders for this visit:    1. Severe obesity ([address]    2. Essential hypertension  -     lisinopril (PRINIVIL, ZESTRIL) 20 mg tablet; Take 1 Tab by mouth daily.  -     LIPID PANEL; Future  -     METABOLIC PANEL, COMPREHENSIVE; Future  -     CBC WITH AUTOMATED DIFF; Future  -     HEMOGLOBIN A1C WITH EAG; Future  -     PSA, DIAGNOSTIC (PROSTATE SPECIFIC AG); Future    3. Acquired hypothyroidism  -     levothyroxine (SYNTHROID) 50 mcg tablet; Take 1 Tab by mouth Daily (before breakfast). -     TSH 3RD GENERATION; Future    4. Chronic gout of left knee, unspecified cause  -     indomethacin (INDOCIN) 50 mg capsule; Take 1 Cap by mouth three (3) times daily as needed for Pain.  -     methylPREDNISolone (MEDROL DOSEPACK) 4 mg tablet; 6 tabs PO day 1, 5 tabs PO day 2, 4 tabs PO day 3, 3 tabs PO day 4, 2 tabs PO day 5, 1 tab PO day 6  -     URIC ACID; Future      Labs today  Medications refilled  Unable to use allopurinol. We need to consider uloric but it is still too expensive for the patient to purchase.  He is in the process of getting health insurance and this will help in getting the medication for the patient

## 2019-08-07 NOTE — PROGRESS NOTES
Avs discussed with Elizabet Guthrie by Discharge Nurse Harriett Arenas LPN. Discussed medication prescribed today, good rx coupon printed and given to pt. Patient verbalized understanding and has no further questions.  AVS printed and given to patient Harriett Arenas LPN

## 2019-08-08 NOTE — PROGRESS NOTES
Normal kidney function and electrolytes,  Liver function has improved  Uric acid has improved now 7.9  Normal prostate test in blood and blood count  Test negative for diabetes  Normal thyroid  Cholesterol levels are normal, triglyceride is elevated  Patient can be informed

## 2019-08-19 ENCOUNTER — TELEPHONE (OUTPATIENT)
Dept: FAMILY MEDICINE CLINIC | Age: 59
End: 2019-08-19

## 2019-08-19 NOTE — TELEPHONE ENCOUNTER
[address] Mar[address] Gallardo wanted to know if a nurse can call him back to give him his lab results .     Thank you

## 2019-09-05 NOTE — TELEPHONE ENCOUNTER
Tc to the pt. Int # [id number]. Informed the pt of the providers results note entirely. The pt asked if he needed to see any other Dr's or be prescribed any other medication because of his blood test results. The pt was told he did not. He denied further questions.  Kath Fuentes RN

## 2020-01-08 DIAGNOSIS — M1A.0620 CHRONIC GOUT OF LEFT KNEE, UNSPECIFIED CAUSE: ICD-10-CM

## 2020-01-08 RX ORDER — INDOMETHACIN 50 MG/1
CAPSULE ORAL
Qty: 30 CAP | Refills: 0 | Status: SHIPPED | OUTPATIENT
Start: 2020-01-08 | End: 2020-02-17

## 2020-02-15 DIAGNOSIS — M1A.0620 CHRONIC GOUT OF LEFT KNEE, UNSPECIFIED CAUSE: ICD-10-CM

## 2020-02-17 RX ORDER — INDOMETHACIN 50 MG/1
CAPSULE ORAL
Qty: 30 CAP | Refills: 0 | Status: SHIPPED | OUTPATIENT
Start: 2020-02-17 | End: 2020-04-06

## 2020-04-03 DIAGNOSIS — M1A.0620 CHRONIC GOUT OF LEFT KNEE, UNSPECIFIED CAUSE: ICD-10-CM

## 2020-04-06 RX ORDER — INDOMETHACIN 50 MG/1
CAPSULE ORAL
Qty: 30 CAP | Refills: 0 | Status: SHIPPED | OUTPATIENT
Start: 2020-04-06 | End: 2020-05-07 | Stop reason: SDUPTHER

## 2020-05-07 DIAGNOSIS — M1A.0620 CHRONIC GOUT OF LEFT KNEE, UNSPECIFIED CAUSE: ICD-10-CM

## 2020-05-07 RX ORDER — INDOMETHACIN 50 MG/1
CAPSULE ORAL
Qty: 30 CAP | Refills: 0 | Status: SHIPPED | OUTPATIENT
Start: 2020-05-07 | End: 2020-07-07 | Stop reason: SDUPTHER

## 2020-05-07 NOTE — TELEPHONE ENCOUNTER
Received a refill request in the [facility] office from the pt's Pharmacy for Indomethacin 50 mg caps. #30. The message was routed to the provider for review.  Freddy Haney RN

## 2020-05-14 NOTE — TELEPHONE ENCOUNTER
Received a refill request form the [facility] office which was received in the [facility] office from the pt's Pharmacy for Indomethicin. This has already been refilled 05/07/20.  Freddy Haney RN

## 2020-05-15 NOTE — TELEPHONE ENCOUNTER
Received a prior authorization follow up request from [employer]. It is asking to complete the authorization form online for the medication Indomethacin 50mg. This form comes from [employer] Routed the message to the provider.  Cora Stout RN

## 2020-06-02 NOTE — TELEPHONE ENCOUNTER
Tc to the pt. No answer, x2. A message was left for him to contact the [facility] office. The pt has insurance and the [facility] will not be able to fill out the Insurance paperwork for the Pharmacy to authorize the medication. The pt needs to be told that he will need to seek a new PCP, and they will be bale to fill out the insurance paperwork. The pt may buy the rx out of pocket if he wants to. The [facility] will no longer be able to provider care for the pt due to he now has insurance. Afsaneh Gonzalez, RN

## 2020-06-05 NOTE — TELEPHONE ENCOUNTER
Closing this encounter the pt has been called and message left. He needs to seek a new PCP. The pt has insurance.  Prasanna Caceres RN

## 2020-06-30 ENCOUNTER — TELEPHONE (OUTPATIENT)
Dept: FAMILY MEDICINE CLINIC | Age: 60
End: 2020-06-30

## 2020-06-30 NOTE — TELEPHONE ENCOUNTER
----- Message from [place]un[place] Leo sent at 6/26/2020  3:05 PM EDT -----  Regarding: Dr. Cara Osorio first and last name:  Wan Umanzor  Reason for call:  needs labs done such as the thyroid and aburic acid, and high blood pressure.    Callback required yes/no and why: yes   Best contact number(s): [phone]  Details to clarify the request:

## 2020-07-07 ENCOUNTER — VIRTUAL VISIT (OUTPATIENT)
Dept: FAMILY MEDICINE CLINIC | Age: 60
End: 2020-07-07

## 2020-07-07 DIAGNOSIS — E03.9 ACQUIRED HYPOTHYROIDISM: ICD-10-CM

## 2020-07-07 DIAGNOSIS — E66.01 SEVERE OBESITY (HCC): ICD-10-CM

## 2020-07-07 DIAGNOSIS — M1A.0620 CHRONIC GOUT OF LEFT KNEE, UNSPECIFIED CAUSE: Primary | ICD-10-CM

## 2020-07-07 DIAGNOSIS — I10 ESSENTIAL HYPERTENSION: ICD-10-CM

## 2020-07-07 RX ORDER — LEVOTHYROXINE SODIUM 50 UG/1
50 TABLET ORAL
Qty: 30 TAB | Refills: 0 | Status: SHIPPED | OUTPATIENT
Start: 2020-07-07 | End: 2020-08-06

## 2020-07-07 RX ORDER — LISINOPRIL 20 MG/1
20 TABLET ORAL DAILY
Qty: 30 TAB | Refills: 0 | Status: SHIPPED | OUTPATIENT
Start: 2020-07-07 | End: 2020-08-06

## 2020-07-07 RX ORDER — INDOMETHACIN 50 MG/1
CAPSULE ORAL
Qty: 30 CAP | Refills: 0 | Status: SHIPPED | OUTPATIENT
Start: 2020-07-07 | End: 2020-08-14

## 2020-07-07 NOTE — PROGRESS NOTES
London Myers  62 y.o. male  1960  [address] [facility]  [id number]    [phone] (home)      [address]:    Telephone Encounter  Latha Devi MD       Encounter Date: 7/7/2020 at 1:25 PM    Consent: López Israel, who was seen by synchronous (real-time) audio only technology, and/or his healthcare decision maker, is aware that this patient-initiated, Telehealth encounter on 7/7/2020 is a billable service, with coverage as determined by his insurance carrier. He is aware that he may receive a bill and has provided verbal consent to proceed: Yes. No chief complaint on file. History of Present Illness   López Israel is a 62 y.o. male was evaluated by telephone. I communicated with the patient and/or health care decision maker about chronic conditions. Hypothyroidism:  - Has Synthroid 50 mcg with refill, not taking, did not realize patient has refills  - Asymptomatic denies CP, SOB, palpitations, fatigue weakness, changes in weight, dry skin, cold/heat intolerance, diarrhea or constipation    HTN:  - Not taking medication Lisinopril 20 mg, did not realize had refills  - Asymptomatic, denies CP, headache, changes in vision, dizziness, LE swelling    Gout:  - Need refill on indomethacin  - Well controlled    Obesity:  - BMI 35  - Reports no change in weight  - Continue to work on diet and exercise    COVID Questioning: Denies recent travel, sick/COVID contacts, URI symptoms, and recent ED visit/hospitalization    Review of Systems   Review of Systems   Constitutional: Negative for chills, diaphoresis, fever and malaise/fatigue. HENT: Negative for congestion, sinus pain and sore throat. Eyes: Negative for blurred vision. Respiratory: Negative for cough and shortness of breath. Cardiovascular: Negative for chest pain, palpitations, orthopnea and leg swelling.    Gastrointestinal: Negative for abdominal pain, constipation, diarrhea, nausea and vomiting. Musculoskeletal: Negative for back pain. Neurological: Negative for dizziness, sensory change, weakness and headaches. Vitals/Objective:   General: Patient speaking in complete sentences without effort. Normal speech and cooperative. Due to this being a Virtual Check-in/Telephone evaluation, many elements of the physical examination are unable to be assessed. Assessment and Plan:   Time-based coding, delete if not needed: I spent at least 15 minutes with this established patient, and >50% of the time was spent counseling and/or coordinating care regarding Chronic Conditions  Total Time: minutes: 11-20 minutes      1. Chronic gout of left knee, unspecified cause: Well controlled on indomethacin. Needs refill    - indomethacin (INDOCIN) 50 mg capsule; TAKE 1 CAPSULE BY MOUTH THREE TIMES DAILY AS NEEDED FOR PAIN  Dispense: 30 Cap; Refill: 0  - URIC ACID; Future    2. Essential hypertension: Asymptomatic. Last CMP in 8/2019 wnl. Reports not taking BP at home. Not complaint with medication Lisinopril 20 mg daily. Did not realize refill available. - Continue Lisinopril 20 mg daily, Refill given for 30 days  - Counseled on diet and exercise  - Instructed patient to keep BP log at home  - Ordered labs, patient to come in for labs before follow up visit  - METABOLIC PANEL, COMPREHENSIVE; Future  - LIPID PANEL; Future  - Urinalysis    4. Acquired hypothyroidism: Asymptomatic. Not complaint with medication synthroid 50 mcg for same reason as stated above. - Continue Synthroid 50 mcg daily, refill given for 30 days  - Instructed on proper use  - Ordered repeat TSH  - TSH 3RD GENERATION; Future    5. Severe obesity ([facility] [address]): Working on diet and exercise. Does not believe recent change in weight. BMI 35 on last visit.   - Ordered labs  - Counseled on diet and exercise  - AMB POC HEMOGLOBIN A1C    --Follow up appointment in clinic to for chronic conditions at 10AM on July 16th      We discussed the expected course, resolution and complications of the diagnosis(es) in detail. Medication risks, benefits, costs, interactions, and alternatives were discussed as indicated. I advised him to contact the office if his condition worsens, changes or fails to improve as anticipated. He expressed understanding with the diagnosis(es) and plan. Patient understands that this encounter was a temporary measure, and the importance of further follow up and examination was emphasized. Patient verbalized understanding. I affirm this is a Patient Initiated Episode with an Established Patient who has not had a related appointment within my department in the past 7 days or scheduled within the next 24 hours. Note: not billable if this call serves to triage the patient into an appointment for the relevant concern      Electronically Signed: Gwen Cerna MD  Providers location when delivering service: [facility]        ICD-10-CM ICD-9-CM    1. Chronic gout of left knee, unspecified cause M1A.0620 274.02 indomethacin (INDOCIN) 50 mg capsule      URIC ACID   2. Essential hypertension U15 333.6 METABOLIC PANEL, COMPREHENSIVE      LIPID PANEL   3. Acquired hypothyroidism E03.9 244.9 TSH 3RD GENERATION   4. Severe obesity ([address] E66.01 278.01 AMB POC HEMOGLOBIN A1C       Pursuant to the emergency declaration under the [facility] waiver authority and the [employer] Act, this Virtual  Visit was conducted, with patient's consent, to reduce the patient's risk of exposure to COVID-19 and provide continuity of care for an established patient. History   Patients past medical, surgical and family histories were personally reviewed and updated.      Past Medical History:   Diagnosis Date    Essential hypertension 7/10/2015    GERD without esophagitis     Gout 2000    Hypertension     Hypothyroidism, adult 7/10/2015    Thyroid disease     Hypothyroidism     No past surgical history on file. Family History   Problem Relation Age of Onset    Hypertension Mother     Diabetes Mother         He thinks she had when older    Cancer Father 76        lung (non-smoker)     Social History     Socioeconomic History    Marital status:      Spouse name: Not on file    Number of children: Not on file    Years of education: Not on file    Highest education level: Not on file   Occupational History    Not on file   Social Needs    Financial resource strain: Not on file    Food insecurity     Worry: Not on file     Inability: Not on file    Transportation needs     Medical: Not on file     Non-medical: Not on file   Tobacco Use    Smoking status: Never Smoker    Smokeless tobacco: Never Used   Substance and Sexual Activity    Alcohol use:  Yes     Alcohol/week: 0.0 standard drinks     Comment: Ocassional    Drug use: No    Sexual activity: Yes     Partners: Female     Birth control/protection: None   Lifestyle    Physical activity     Days per week: Not on file     Minutes per session: Not on file    Stress: Not on file   Relationships    Social connections     Talks on phone: Not on file     Gets together: Not on file     Attends [employer] service: Not on file     Active member of club or organization: Not on file     Attends meetings of clubs or organizations: Not on file     Relationship status: Not on file    Intimate partner violence     Fear of current or ex partner: Not on file     Emotionally abused: Not on file     Physically abused: Not on file     Forced sexual activity: Not on file   Other Topics Concern    Not on file   Social History Narrative    ** Merged History Encounter **                 Current Medications/Allergies   Medications and Allergies reviewed:    Current Outpatient Medications   Medication Sig Dispense Refill    indomethacin (INDOCIN) 50 mg capsule TAKE 1 CAPSULE BY MOUTH THREE TIMES DAILY AS NEEDED FOR PAIN 30 Cap 0    lisinopril (PRINIVIL, ZESTRIL) 20 mg tablet Take 1 Tab by mouth daily. 90 Tab 2    levothyroxine (SYNTHROID) 50 mcg tablet Take 1 Tab by mouth Daily (before breakfast). 90 Tab 3    methylPREDNISolone (MEDROL DOSEPACK) 4 mg tablet 6 tabs PO day 1, 5 tabs PO day 2, 4 tabs PO day 3, 3 tabs PO day 4, 2 tabs PO day 5, 1 tab PO day 6 1 Dose Pack 2    naphazoline (NAPHCON) 0.1 % ophthalmic solution Administer 2 Drops to both eyes four (4) times daily as needed. Coloque 2 gotas a los ojos cada 6 horas si necesita para dolor, iritacion 15 mL 1    probenecid (BENEMID) 500 mg tablet Take 1 Tab by mouth two (2) times a day. For gout prevention. 60 Tab 5    diclofenac (VOLTAREN) 1 % gel Apply 2 g to affected area four (4) times daily.  100 g 2     Allergies   Allergen Reactions    Allopurinol Rash

## 2020-07-14 ENCOUNTER — TELEPHONE (OUTPATIENT)
Dept: FAMILY MEDICINE CLINIC | Age: 60
End: 2020-07-14

## 2020-07-14 NOTE — TELEPHONE ENCOUNTER
----- Message from Ankit Walker sent at 7/13/2020  4:21 PM EDT -----  Regarding: Dr. Alejandra Kapadia  Appointment not available    Caller's first and last name and relationship to patient (if not the patient):      Best contact number: [phone]      Preferred date and time: Within two weeks       Scheduled appointment date and time: July 16th@ 10AM      Reason for appointment: Hypothy.  And obesity F/Up       Details to clarify the request:      Mona Winters

## 2020-07-14 NOTE — TELEPHONE ENCOUNTER
Spoke with patient. He lost his job and is now out of town. He will not be back until probably in AUG. He will call back then to schedule. Also, Dr. Benji Rodriguez, should you need to speak with the patient, please call him.           July 16, 2020  10:00 AM x cancelled:  PATIENT CANCEL NO RESCHEDULE  [facility]-MAIN OFFICE [id number]  Routine Care, 30 min    07/07/2020 [id number]: htn, hypothyroidism and obesity follow up

## 2020-07-20 NOTE — PROGRESS NOTES
[address] Medicine Residency Attending Addendum:  Dr. Gwen Cerna MD,  the patient and I were not physically present during this encounter. The resident and I are concurrently monitoring the patient care through appropriate telecommunication technology. I discussed the findings, assessment and plan with the resident and agree with the resident's findings and plan as documented in the resident's note.       Tessy Mckay MD

## 2020-08-07 ENCOUNTER — HOSPITAL ENCOUNTER (OUTPATIENT)
Dept: LAB | Age: 60
Discharge: HOME | End: 2020-08-07

## 2020-08-07 ENCOUNTER — LAB ONLY (OUTPATIENT)
Dept: FAMILY MEDICINE CLINIC | Age: 60
End: 2020-08-07

## 2020-08-07 DIAGNOSIS — E03.9 ACQUIRED HYPOTHYROIDISM: ICD-10-CM

## 2020-08-07 DIAGNOSIS — E66.01 SEVERE OBESITY (HCC): ICD-10-CM

## 2020-08-07 DIAGNOSIS — I10 ESSENTIAL HYPERTENSION: ICD-10-CM

## 2020-08-07 DIAGNOSIS — M1A.0620 CHRONIC GOUT OF LEFT KNEE, UNSPECIFIED CAUSE: ICD-10-CM

## 2020-08-07 LAB
ALBUMIN SERPL-MCNC: 4.1 G/DL (ref 3.5–5)
ALBUMIN/GLOB SERPL: 1.2 {RATIO} (ref 1.1–2.2)
ALP SERPL-CCNC: 63 U/L (ref 45–117)
ALT SERPL-CCNC: 124 U/L (ref 12–78)
ANION GAP SERPL CALC-SCNC: 8 MMOL/L (ref 5–15)
APPEARANCE UR: CLEAR
AST SERPL-CCNC: 68 U/L (ref 15–37)
BILIRUB SERPL-MCNC: 0.6 MG/DL (ref 0.2–1)
BILIRUB UR QL: NEGATIVE
BUN SERPL-MCNC: 18 MG/DL (ref 6–20)
BUN/CREAT SERPL: 19 (ref 12–20)
CALCIUM SERPL-MCNC: 9.4 MG/DL (ref 8.5–10.1)
CHLORIDE SERPL-SCNC: 108 MMOL/L (ref 97–108)
CHOLEST SERPL-MCNC: 195 MG/DL
CO2 SERPL-SCNC: 22 MMOL/L (ref 21–32)
COLOR UR: NORMAL
COMMENT, HOLDF: NORMAL
CREAT SERPL-MCNC: 0.95 MG/DL (ref 0.7–1.3)
EST. AVERAGE GLUCOSE BLD GHB EST-MCNC: 117 MG/DL
GLOBULIN SER CALC-MCNC: 3.4 G/DL (ref 2–4)
GLUCOSE SERPL-MCNC: 98 MG/DL (ref 65–100)
GLUCOSE UR STRIP.AUTO-MCNC: NEGATIVE MG/DL
HBA1C MFR BLD: 5.7 % (ref 4–5.6)
HDLC SERPL-MCNC: 44 MG/DL
HDLC SERPL: 4.4 {RATIO} (ref 0–5)
HGB UR QL STRIP: NEGATIVE
KETONES UR QL STRIP.AUTO: NEGATIVE MG/DL
LDLC SERPL CALC-MCNC: 118.2 MG/DL (ref 0–100)
LEUKOCYTE ESTERASE UR QL STRIP.AUTO: NEGATIVE
LIPID PROFILE,FLP: ABNORMAL
NITRITE UR QL STRIP.AUTO: NEGATIVE
PH UR STRIP: 5 [PH] (ref 5–8)
POTASSIUM SERPL-SCNC: 4.4 MMOL/L (ref 3.5–5.1)
PROT SERPL-MCNC: 7.5 G/DL (ref 6.4–8.2)
PROT UR STRIP-MCNC: NEGATIVE MG/DL
SAMPLES BEING HELD,HOLD: NORMAL
SODIUM SERPL-SCNC: 138 MMOL/L (ref 136–145)
SP GR UR REFRACTOMETRY: 1.01 (ref 1–1.03)
TRIGL SERPL-MCNC: 164 MG/DL (ref ?–150)
TSH SERPL DL<=0.05 MIU/L-ACNC: 2.28 UIU/ML (ref 0.36–3.74)
URATE SERPL-MCNC: 9 MG/DL (ref 3.5–7.2)
UROBILINOGEN UR QL STRIP.AUTO: 0.2 EU/DL (ref 0.2–1)
VLDLC SERPL CALC-MCNC: 32.8 MG/DL

## 2020-08-11 NOTE — PROGRESS NOTES
UA unremarkable  A1c showing prediabetes at 5.7, will discuss lifestyle changes  TSH wnl  CMP with mild transaminitis, chronic, will review diet, weight loss, and meds with patient  Uric Acid elevated at 9, chronic, stable, range 4.7 - 10, last measurement 7.9 on 8/2019  Lipid Panel with elevated TGs at 164 and LDL at 118.2, will discuss with patient, not on statin, was elevated a year ago. ASCVD risk 9.2%, Per USPSTF statin use not endorsed.   Will discuss results and plan with patient on next visit 8/14

## 2020-08-14 ENCOUNTER — OFFICE VISIT (OUTPATIENT)
Dept: FAMILY MEDICINE CLINIC | Age: 60
End: 2020-08-14

## 2020-08-14 VITALS
HEART RATE: 83 BPM | SYSTOLIC BLOOD PRESSURE: 146 MMHG | WEIGHT: 244.25 LBS | DIASTOLIC BLOOD PRESSURE: 77 MMHG | RESPIRATION RATE: 16 BRPM | HEIGHT: 67 IN | OXYGEN SATURATION: 96 % | TEMPERATURE: 98.7 F | BODY MASS INDEX: 38.34 KG/M2

## 2020-08-14 DIAGNOSIS — M1A.0620 CHRONIC GOUT OF LEFT KNEE, UNSPECIFIED CAUSE: ICD-10-CM

## 2020-08-14 DIAGNOSIS — E78.5 HYPERLIPIDEMIA, UNSPECIFIED HYPERLIPIDEMIA TYPE: ICD-10-CM

## 2020-08-14 DIAGNOSIS — N40.0 BENIGN PROSTATIC HYPERPLASIA, UNSPECIFIED WHETHER LOWER URINARY TRACT SYMPTOMS PRESENT: ICD-10-CM

## 2020-08-14 DIAGNOSIS — E03.9 ACQUIRED HYPOTHYROIDISM: ICD-10-CM

## 2020-08-14 DIAGNOSIS — I10 ESSENTIAL HYPERTENSION: Primary | ICD-10-CM

## 2020-08-14 DIAGNOSIS — K21.9 GASTROESOPHAGEAL REFLUX DISEASE, ESOPHAGITIS PRESENCE NOT SPECIFIED: ICD-10-CM

## 2020-08-14 PROCEDURE — 99214 OFFICE O/P EST MOD 30 MIN: CPT | Performed by: STUDENT IN AN ORGANIZED HEALTH CARE EDUCATION/TRAINING PROGRAM

## 2020-08-14 RX ORDER — ATORVASTATIN CALCIUM 20 MG/1
20 TABLET, FILM COATED ORAL DAILY
Qty: 30 TAB | Refills: 2 | Status: SHIPPED | OUTPATIENT
Start: 2020-08-14 | End: 2020-12-02

## 2020-08-14 RX ORDER — INDOMETHACIN 50 MG/1
CAPSULE ORAL
Qty: 30 CAP | Refills: 2 | Status: SHIPPED | OUTPATIENT
Start: 2020-08-14 | End: 2021-02-08 | Stop reason: SDUPTHER

## 2020-08-14 RX ORDER — LISINOPRIL 20 MG/1
20 TABLET ORAL
Qty: 30 TAB | Refills: 2 | Status: SHIPPED | OUTPATIENT
Start: 2020-08-14 | End: 2021-02-08 | Stop reason: SDUPTHER

## 2020-08-14 RX ORDER — PROBENECID 500 MG/1
500 TABLET, FILM COATED ORAL 2 TIMES DAILY
Qty: 60 TAB | Refills: 2 | Status: SHIPPED | OUTPATIENT
Start: 2020-08-14

## 2020-08-14 RX ORDER — FAMOTIDINE 20 MG/1
20 TABLET, FILM COATED ORAL 2 TIMES DAILY
Qty: 60 TAB | Refills: 2 | Status: SHIPPED | OUTPATIENT
Start: 2020-08-14

## 2020-08-14 RX ORDER — LEVOTHYROXINE SODIUM 75 UG/1
75 TABLET ORAL
Qty: 30 TAB | Refills: 2 | Status: SHIPPED | OUTPATIENT
Start: 2020-08-14 | End: 2020-10-23 | Stop reason: SDUPTHER

## 2020-08-14 RX ORDER — TAMSULOSIN HYDROCHLORIDE 0.4 MG/1
0.4 CAPSULE ORAL DAILY
Qty: 30 CAP | Refills: 2 | Status: SHIPPED | OUTPATIENT
Start: 2020-08-14 | End: 2021-06-28

## 2020-08-14 NOTE — PROGRESS NOTES
Chief Complaint   Patient presents with    Medication Refill     Patient presents for medication refills & to discuss lab results. Vitals:    08/14/20 1622   BP: 146/77   BP 1 Location: Left arm   BP Patient Position: Sitting   Pulse: 83   Resp: 16   Temp: 98.7 °F (37.1 °C)   TempSrc: Oral   SpO2: 96%   Weight: 244 lb 4 oz (110.8 kg)   Height: 5' 7\" (1.702 m)       1. Have you been to the ER, urgent care clinic since your last visit? Hospitalized since your last visit? Yes, Multiple places; last place [facility]. 2. Have you seen or consulted any other health care providers outside of the [address] [place] [address] since your last visit? Include any pap smears or colon screening. Yes, last place [facility] .

## 2020-08-14 NOTE — PROGRESS NOTES
[address] [facility] [address] [address]   Office [phone], Fax [phone]    Subjective:     Chief Complaint   Patient presents with    Medication Refill     Patient presents for medication refills & to discuss lab results. HPI:  Bradley Hurley is a 62 y.o. male that presents for:       Hypothyroidism:   - Taking Synthroid 75 mcg  - Asymptomatic denies CP, SOB, palpitations, fatigue weakness, changes in weight, dry skin, cold/heat intolerance, diarrhea or constipation  - TSH on 7/72020 was wnl at 2.28     HTN:  - Not taking medication Lisinopril 20 mg, needs refill, tolerated well previously  - BP today 146/77  - Risk Factors: Obesity, Etoh Use, HLD                - No smoking, No DM  - Reports poor diet, good exercise with work  - ASCVD score 10.6%, not on statin  - Asymptomatic, denies CP, headache, changes in vision, dizziness, LE swelling     Gout:  - Need refill on indomethacin and Probenecid  - Currently stable  - Uric acid on 8/2019 slightly elevated at 9  - Well controlled     Obesity:  - BMI 38  - Reports very poor diet  - Continues to work on diet and exercise    GERD:   - Poor sleep hygiene and diet    HLD:  - Lipid panel on 8/2020 showed elevations in TGs at 164 and .2  - Not on statin    BPH:  - Tamulosin 0.4mg, needs refill  - Well controlled  - Reports intermittent symptoms of urinary hesitancy and initiating of stream  - Reports getting up to urinate 1-2 times at night  - Denies dysuria, hesitancy, decreased urinary stream      Past Medical Hx  I personally reviewed. Past Medical History:   Diagnosis Date    Essential hypertension 7/10/2015    GERD without esophagitis     Gout 2000    Hypertension     Hypothyroidism, adult 7/10/2015    Thyroid disease     Hypothyroidism        SocHx   I personally reviewed. Social History     Tobacco Use    Smoking status: Never Smoker    Smokeless tobacco: Never Used   Substance Use Topics    Alcohol use:  Yes Alcohol/week: 0.0 standard drinks     Comment: Ocassional    Drug use: No        Allergies  I personally reviewed. Allergies   Allergen Reactions    Allopurinol Rash        Medications  I personally reviewed. Current Outpatient Medications on File Prior to Visit   Medication Sig Dispense Refill    [DISCONTINUED] indomethacin (INDOCIN) 50 mg capsule TAKE 1 CAPSULE BY MOUTH THREE TIMES DAILY AS NEEDED FOR PAIN 30 Cap 0    [DISCONTINUED] methylPREDNISolone (MEDROL DOSEPACK) 4 mg tablet 6 tabs PO day 1, 5 tabs PO day 2, 4 tabs PO day 3, 3 tabs PO day 4, 2 tabs PO day 5, 1 tab PO day 6 1 Dose Pack 2    [DISCONTINUED] naphazoline (NAPHCON) 0.1 % ophthalmic solution Administer 2 Drops to both eyes four (4) times daily as needed. Coloque 2 gotas a los ojos cada 6 horas si necesita para dolor, iritacion 15 mL 1    [DISCONTINUED] probenecid (BENEMID) 500 mg tablet Take 1 Tab by mouth two (2) times a day. For gout prevention. 60 Tab 5    [DISCONTINUED] diclofenac (VOLTAREN) 1 % gel Apply 2 g to affected area four (4) times daily. 100 g 2     No current facility-administered medications on file prior to visit. ROS:     Gen: No Fevers, Chills,  Fatigue, Night sweats. Head: No Headache, Trauma. ENT: No Blurry vision, Diplopia, Rhinorrhea, Congestion, Dysphagia, Odynophagia. Cardiovascular: No Chest pain, Palpitations. Respiratory: No Dyspnea, Cough. GI: No Abdominal pain, Nausea, Vomiting, Hematochezia/Melena, Change in stools. : No Dysuria, Hematuria. MSK: No Myalgias, Arthralgias. Neuro: No Numbness, Tingling, Seizures, LOC. Objective:   Vitals  I personally reviewed. Visit Vitals  /77 (BP 1 Location: Left arm, BP Patient Position: Sitting)   Pulse 83   Temp 98.7 °F (37.1 °C) (Oral)   Resp 16   Ht 5' 7\" (1.702 m)   Wt 244 lb 4 oz (110.8 kg)   SpO2 96%   BMI 38.25 kg/m²        Physical Exam    Vitals Reviewed. General AO x 3. No distress. Not diaphoretic. No jaundice.  No cyanosis. No pallor. Neck No thyromegaly present. No cervical adenopathy. Cardio Normal rate, regular rhythm. No murmur, gallop, or friction rub. No chest wall tenderness. Pulmonary Effort normal. Breath sounds normal. No respiratory distress. No wheezes, rales, or rhonchi. No Stridor. Abdominal Soft. Bowel sounds normal. No distension. No tenderness. Extremities No edema of lower extremities. No tenderness. Distal pulses intact. Neurological CN II-XII grossly intact. No focal deficits. Skin Skin is warm and dry. No rash noted. No erythema. I personally reviewed the following Pertinent Labs/Studies:   - labs from 7/7/2020    Assessment:       ICD-10-CM ICD-9-CM    1. Essential hypertension  I10 401.9 lisinopriL (PRINIVIL, ZESTRIL) 20 mg tablet   2. Chronic gout of left knee, unspecified cause  M1A.0620 274.02 indomethacin (INDOCIN) 50 mg capsule      probenecid (BENEMID) 500 mg tablet   3. Gastroesophageal reflux disease, esophagitis presence not specified  K21.9 530.81 famotidine (PEPCID) 20 mg tablet   4. Hyperlipidemia, unspecified hyperlipidemia type  E78.5 272.4 atorvastatin (LIPITOR) 20 mg tablet   5. Benign prostatic hyperplasia, unspecified whether lower urinary tract symptoms present  N40.0 600.00 tamsulosin (FLOMAX) 0.4 mg capsule   6. Acquired hypothyroidism  E03.9 244.9 levothyroxine (SYNTHROID) 75 mcg tablet       Plan:   1. Essential hypertension: Systolic BP elevated in office, not taking med bc needs refill  - - ASCVD score 10.6%, will start statin  - Counseled on Etoh use and reducing drinking  - Counseled on DASH/healthy diet, exercise, and weight loss  - refill given  - Advised to monitor BP at home and keep log  - lisinopriL (PRINIVIL, ZESTRIL) 20 mg tablet; Take 1 Tab by mouth nightly. Dispense: 30 Tab; Refill: 2  - Repeat BMP 3 months    2.  Chronic gout of left knee, unspecified cause: Uric Acid elevated but not taking probenecid bc needed refill  - Asymptomatic  - Refills given  - Can increase probenecid as needed if Uric Acid remains high  - indomethacin (INDOCIN) 50 mg capsule; TAKE 1 CAPSULE BY MOUTH THREE TIMES DAILY AS NEEDED FOR PAIN  Indications: a type of joint disorder due to excess uric acid in the blood called gout  Dispense: 30 Cap; Refill: 2  - probenecid (BENEMID) 500 mg tablet; Take 1 Tab by mouth two (2) times a day. For gout prevention. Dispense: 60 Tab; Refill: 2  - Repeat Uric acid 3 months    3. Gastroesophageal reflux disease, esophagitis presence not specified: Well controlled with pepcid  - famotidine (PEPCID) 20 mg tablet; Take 1 Tab by mouth two (2) times a day. Indications: gastroesophageal reflux disease  Dispense: 60 Tab; Refill: 2    4. Hyperlipidemia, unspecified hyperlipidemia type  - Lipid panel on 8/2020 showed elevations in TGs at 164 and .2  - ASCVD score 10.6%  - Started lipitor today, counseled on SE's (stop talking and call office)  - atorvastatin (LIPITOR) 20 mg tablet; Take 1 Tab by mouth daily. Indications: high cholesterol and high triglycerides  Dispense: 30 Tab; Refill: 2    5. Benign prostatic hyperplasia, unspecified whether lower urinary tract symptoms present: Well controlled with Flomax. - Currently asymptomatic  - Obtain AUA BPH score on next visit  - tamsulosin (FLOMAX) 0.4 mg capsule; Take 1 Cap by mouth daily. Indications: enlarged prostate with urination problem  Dispense: 30 Cap; Refill: 2    6. Acquired hypothyroidism: Well controlled. - Repeat TSH in 3 months  - levothyroxine (SYNTHROID) 75 mcg tablet; Take 1 Tab by mouth Daily (before breakfast). Indications: a condition with low thyroid hormone levels  Dispense: 30 Tab; Refill: 2      **Patient will schedule f/u appt for chronic conditions: HTN (repeat BMP), Hypothyroid (repeat TSH), Gout (uric acid level repeat), and BPH     Pt was discussed with Dr Fredi Fernandez (attending physician).     I have reviewed patient medical and social history and medications. I have reviewed pertinent labs results and other data. I have discussed the diagnosis with the patient and the intended plan as seen in the above orders. The patient has received an after-visit summary and questions were answered concerning future plans. I have discussed medication side effects and warnings with the patient as well.     Timoteo Cesar MD  Resident [facility]  08/14/20

## 2020-10-23 DIAGNOSIS — E03.9 ACQUIRED HYPOTHYROIDISM: ICD-10-CM

## 2020-10-23 RX ORDER — LEVOTHYROXINE SODIUM 75 UG/1
75 TABLET ORAL
Qty: 30 TAB | Refills: 2 | Status: SHIPPED | OUTPATIENT
Start: 2020-10-23 | End: 2021-02-08 | Stop reason: SDUPTHER

## 2020-11-27 DIAGNOSIS — E78.5 HYPERLIPIDEMIA, UNSPECIFIED HYPERLIPIDEMIA TYPE: ICD-10-CM

## 2020-12-02 RX ORDER — ATORVASTATIN CALCIUM 20 MG/1
TABLET, FILM COATED ORAL
Qty: 30 TAB | Refills: 0 | Status: SHIPPED | OUTPATIENT
Start: 2020-12-02 | End: 2021-02-08 | Stop reason: SDUPTHER

## 2020-12-02 NOTE — TELEPHONE ENCOUNTER
Will schedule inpatient clinic visit to discuss chronic mild transaminitis and repeat CMP d/t recent initiation of statin. Pt was lost to follow up, CMP overdue.

## 2020-12-04 ENCOUNTER — TELEPHONE (OUTPATIENT)
Dept: FAMILY MEDICINE CLINIC | Age: 60
End: 2020-12-04

## 2020-12-04 NOTE — TELEPHONE ENCOUNTER
----- Message from Corey Nuñez MD sent at 12/2/2020 12:10 PM EST -----  Regarding: In clinic visit  Please schedule in clinic visit for this patient to discuss elevated liver enzyme levels and chronic conditions. One of my acute slots will be fine.     Thank you,  Ava

## 2021-02-08 ENCOUNTER — OFFICE VISIT (OUTPATIENT)
Dept: FAMILY MEDICINE CLINIC | Age: 61
End: 2021-02-08
Payer: MEDICAID

## 2021-02-08 VITALS
WEIGHT: 238.6 LBS | RESPIRATION RATE: 16 BRPM | BODY MASS INDEX: 38.35 KG/M2 | OXYGEN SATURATION: 97 % | TEMPERATURE: 97.3 F | SYSTOLIC BLOOD PRESSURE: 137 MMHG | HEART RATE: 76 BPM | DIASTOLIC BLOOD PRESSURE: 77 MMHG | HEIGHT: 66 IN

## 2021-02-08 DIAGNOSIS — M1A.0620 CHRONIC GOUT OF LEFT KNEE, UNSPECIFIED CAUSE: ICD-10-CM

## 2021-02-08 DIAGNOSIS — E78.5 HYPERLIPIDEMIA, UNSPECIFIED HYPERLIPIDEMIA TYPE: ICD-10-CM

## 2021-02-08 DIAGNOSIS — I10 ESSENTIAL HYPERTENSION: ICD-10-CM

## 2021-02-08 DIAGNOSIS — E03.9 ACQUIRED HYPOTHYROIDISM: ICD-10-CM

## 2021-02-08 PROCEDURE — 99214 OFFICE O/P EST MOD 30 MIN: CPT | Performed by: STUDENT IN AN ORGANIZED HEALTH CARE EDUCATION/TRAINING PROGRAM

## 2021-02-08 RX ORDER — ATORVASTATIN CALCIUM 20 MG/1
TABLET, FILM COATED ORAL
Qty: 30 TAB | Refills: 3 | Status: SHIPPED | OUTPATIENT
Start: 2021-02-08 | End: 2021-02-09

## 2021-02-08 RX ORDER — INDOMETHACIN 50 MG/1
CAPSULE ORAL
Qty: 30 CAP | Refills: 3 | Status: SHIPPED | OUTPATIENT
Start: 2021-02-08

## 2021-02-08 RX ORDER — LEVOTHYROXINE SODIUM 75 UG/1
75 TABLET ORAL
Qty: 30 TAB | Refills: 3 | Status: SHIPPED | OUTPATIENT
Start: 2021-02-08 | End: 2021-06-28

## 2021-02-08 RX ORDER — LISINOPRIL 20 MG/1
20 TABLET ORAL
Qty: 30 TAB | Refills: 3 | Status: SHIPPED | OUTPATIENT
Start: 2021-02-08 | End: 2021-06-28

## 2021-02-08 NOTE — PROGRESS NOTES
Chief Complaint   Patient presents with    Follow Up Chronic Condition     Patient presents to follow up on hypertension & gout; would also like to get medication refills. Vitals:    02/08/21 0906   BP: 137/77   BP 1 Location: Left upper arm   BP Patient Position: Sitting   BP Cuff Size: Adult   Pulse: 76   Resp: 16   Temp: 97.3 °F (36.3 °C)   TempSrc: Temporal   SpO2: 97%   Weight: 238 lb 9.6 oz (108.2 kg)   Height: 5' 6\" (1.676 m)       1. Have you been to the ER, urgent care clinic since your last visit? Hospitalized since your last visit? No     2. Have you seen or consulted any other health care providers outside of the [address] [place] [address] since your last visit? Include any pap smears or colon screening.  No

## 2021-02-08 NOTE — PROGRESS NOTES
[address] [facility] [address] [place] [address]   Office [phone], Fax [phone]    Subjective:     Chief Complaint   Patient presents with    Follow Up Chronic Condition     Patient presents to follow up on hypertension & gout; would also like to get medication refills. HPI:  Rosaura Gonzalez is a 62 y.o. male that presents for:    Medication refills for chronic conditions: HTN, gout, HLD, and hypothyroidism. Patient has no new concerns. Patient reports being out of most of his medications. Not taking BP at home. Denies any recent gout flares. Has not been taking probenecid as prescribed. Denies headache, dizziness, visual disturbances, CP, SOB, dysuria, polyuria, dysphagia, hoarseness, cold intolerance, abdominal/back pain, and n/v/d/c. Past Medical Hx  I personally reviewed. Past Medical History:   Diagnosis Date    Essential hypertension 7/10/2015    GERD without esophagitis     Gout 2000    Hypertension     Hypothyroidism, adult 7/10/2015    Thyroid disease     Hypothyroidism        SocHx   I personally reviewed. Social History     Tobacco Use    Smoking status: Never Smoker    Smokeless tobacco: Never Used   Substance Use Topics    Alcohol use: Yes     Alcohol/week: 0.0 standard drinks     Comment: Ocassional    Drug use: No        Allergies  I personally reviewed. Allergies   Allergen Reactions    Allopurinol Rash        Medications  I personally reviewed. Current Outpatient Medications on File Prior to Visit   Medication Sig Dispense Refill    probenecid (BENEMID) 500 mg tablet Take 1 Tab by mouth two (2) times a day. For gout prevention. 60 Tab 2    famotidine (PEPCID) 20 mg tablet Take 1 Tab by mouth two (2) times a day. Indications: gastroesophageal reflux disease 60 Tab 2    tamsulosin (FLOMAX) 0.4 mg capsule Take 1 Cap by mouth daily.  Indications: enlarged prostate with urination problem 30 Cap 2     No current facility-administered medications on file prior to visit. ROS:     Review of Systems   Constitutional: Negative for chills, diaphoresis and fever. HENT: Negative for congestion, sinus pain and sore throat. Eyes: Negative for blurred vision. Respiratory: Negative for cough and shortness of breath. Cardiovascular: Negative for chest pain, palpitations, orthopnea and leg swelling. Gastrointestinal: Negative for abdominal pain, constipation, diarrhea, heartburn, nausea and vomiting. Genitourinary: Negative for dysuria, flank pain and frequency. Skin: Negative for rash. Neurological: Negative for dizziness and headaches. Endo/Heme/Allergies: Negative for polydipsia. Objective:   Vitals  I personally reviewed. Visit Vitals  /77 (BP 1 Location: Left upper arm, BP Patient Position: Sitting, BP Cuff Size: Adult)   Pulse 76   Temp 97.3 °F (36.3 °C) (Temporal)   Resp 16   Ht 5' 6\" (1.676 m)   Wt 238 lb 9.6 oz (108.2 kg)   SpO2 97%   BMI 38.51 kg/m²        Physical Exam    Vitals Reviewed. General AO x 3. No distress. Not diaphoretic. No jaundice. No cyanosis. No pallor. Neck No thyromegaly present. No cervical adenopathy. Cardio Normal rate, regular rhythm. No murmur, gallop, or friction rub. No chest wall tenderness. Pulmonary Effort normal. Breath sounds normal. No respiratory distress. No wheezes, rales, or rhonchi. No Stridor. Abdominal Soft. Bowel sounds normal. No distension. No tenderness. Extremities No edema of lower extremities. No tenderness. Distal pulses intact. Neurological CN II-XII grossly intact. No focal deficits. Skin Skin is warm and dry. No rash noted. No erythema. I personally reviewed the following Pertinent Labs/Studies:   - Encounter from 2020 and 2019, labs 2020    Assessment:       ICD-10-CM ICD-9-CM    1. Hyperlipidemia, unspecified hyperlipidemia type  E78.5 272.4 atorvastatin (LIPITOR) 20 mg tablet   2.  Chronic gout of left knee, unspecified cause  M1A.0620 274.02 indomethacin (INDOCIN) 50 mg capsule   3. Acquired hypothyroidism  E03.9 244.9 levothyroxine (SYNTHROID) 75 mcg tablet   4. Essential hypertension  I10 401.9 lisinopriL (PRINIVIL, ZESTRIL) 20 mg tablet       Plan:     1. Hyperlipidemia, unspecified hyperlipidemia type  Lab Results   Component Value Date/Time    Cholesterol, total 195 08/07/2020 09:09 AM    HDL Cholesterol 44 08/07/2020 09:09 AM    LDL, calculated 118.2 (H) 08/07/2020 09:09 AM    VLDL, calculated 32.8 08/07/2020 09:09 AM    Triglyceride 164 (H) 08/07/2020 09:09 AM    CHOL/HDL Ratio 4.4 08/07/2020 09:09 AM   - Started on Lipitor since last lipid panel, was noncompliant prior d/t no insurance  - Out of med, refill given  - Counseled on diet, exercise, and weight loss  - atorvastatin (LIPITOR) 20 mg tablet; Take 1 tablet by mouth once daily  Dispense: 30 Tab; Refill: 3    2. Chronic gout of left knee, unspecified cause  - (8/2020) Uric Acid 9 (7.9)  - Non compliant with probenecid, does not need refills  - Needs refill on indocin prn, cautioned about chronic indocin use with HTN/HTN meds  - Counseled on importance of medical compliance and risks of non-compliance including but not limited to heart/kidney disease, diabetes, and liver disease  - indomethacin (INDOCIN) 50 mg capsule; TAKE 1 CAPSULE BY MOUTH THREE TIMES DAILY AS NEEDED FOR PAIN  Indications: a type of joint disorder due to excess uric acid in the blood called gout  Dispense: 30 Cap; Refill: 3    3. Acquired hypothyroidism  - (8/2020) TSH wnl  - Out of med, refill given  - levothyroxine (SYNTHROID) 75 mcg tablet; Take 1 Tab by mouth Daily (before breakfast). Indications: a condition with low thyroid hormone levels  Dispense: 30 Tab; Refill: 3    4.  Essential hypertension  - Good control  - not monitoring BP at home  - Counseled on DASH diet, exercise, caffeine intake, and weight loss  - Careful with indocin use, monitor kidney function periodically  - Avoid OTC decongestants such as pseudoephedrine and Afrin  - Encouraged to keep Daily BP log  - Out of medication, refill given  - lisinopriL (PRINIVIL, ZESTRIL) 20 mg tablet; Take 1 Tab by mouth nightly. Dispense: 30 Tab; Refill: 3    * follow up in 3 weeks for gout, non compliant with meds, needed refill       - Obtain CMP for renal function given nsaid use and uric acid on next visit after patient takes probenicid routinely       - Address health care gaps       - Address mild transaminitis, likely 2/2 BELL vs uricemia uncontrolled d/t non complaince, hep panel negative in 2015       - Address Obesity BMI 38      Pt was discussed with Dr Nancy Morataya (attending physician). I have reviewed patient medical and social history and medications. I have reviewed pertinent labs results and other data. I have discussed the diagnosis with the patient and the intended plan as seen in the above orders. The patient has received an after-visit summary and questions were answered concerning future plans. I have discussed medication side effects and warnings with the patient as well.     Claribel Henderson MD  Resident [facility]  02/09/21

## 2021-02-09 PROBLEM — E78.5 HYPERLIPIDEMIA: Status: ACTIVE | Noted: 2021-02-09

## 2021-02-09 RX ORDER — ATORVASTATIN CALCIUM 20 MG/1
TABLET, FILM COATED ORAL
Qty: 30 TAB | Refills: 3 | Status: SHIPPED | OUTPATIENT
Start: 2021-02-09 | End: 2021-06-28

## 2021-03-01 ENCOUNTER — TELEPHONE (OUTPATIENT)
Dept: FAMILY MEDICINE CLINIC | Age: 61
End: 2021-03-01

## 2021-03-01 NOTE — TELEPHONE ENCOUNTER
Tried to call the patient for their appt this morning. They have calling restrictions on their phone and couldn't leave a message.

## 2021-03-26 ENCOUNTER — TRANSCRIBE ORDER (OUTPATIENT)
Dept: INTERNAL MEDICINE CLINIC | Age: 61
End: 2021-03-26

## 2021-03-29 ENCOUNTER — TELEPHONE (OUTPATIENT)
Dept: FAMILY MEDICINE CLINIC | Age: 61
End: 2021-03-29

## 2021-03-29 NOTE — TELEPHONE ENCOUNTER
There are no orders in the chart to schedule any lab appt. Spoke with patient with [facility] and patient was informed a message would be sent to provider for review.

## 2021-03-29 NOTE — TELEPHONE ENCOUNTER
----- Message from Fredi Ortiz sent at 3/26/2021  4:48 PM EDT -----  Regarding: [facility] [address]  Appointment not available    [facility] first and last name and relationship to patient (if not the patient):      Best contact number:[phone]      Preferred date and time: within the next week.       Scheduled appointment date and time: N/A      Reason for appointment: labs      Details to clarify the request:      Fredi Ortiz

## 2021-06-28 DIAGNOSIS — E78.5 HYPERLIPIDEMIA, UNSPECIFIED HYPERLIPIDEMIA TYPE: ICD-10-CM

## 2021-06-28 DIAGNOSIS — I10 ESSENTIAL HYPERTENSION: ICD-10-CM

## 2021-06-28 DIAGNOSIS — E03.9 ACQUIRED HYPOTHYROIDISM: ICD-10-CM

## 2021-06-28 DIAGNOSIS — N40.0 BENIGN PROSTATIC HYPERPLASIA, UNSPECIFIED WHETHER LOWER URINARY TRACT SYMPTOMS PRESENT: ICD-10-CM

## 2021-06-28 RX ORDER — LISINOPRIL 20 MG/1
TABLET ORAL
Qty: 30 TABLET | Refills: 0 | Status: SHIPPED | OUTPATIENT
Start: 2021-06-28 | End: 2021-10-31

## 2021-06-28 RX ORDER — TAMSULOSIN HYDROCHLORIDE 0.4 MG/1
CAPSULE ORAL
Qty: 30 CAPSULE | Refills: 0 | Status: SHIPPED | OUTPATIENT
Start: 2021-06-28

## 2021-06-28 RX ORDER — LEVOTHYROXINE SODIUM 75 UG/1
TABLET ORAL
Qty: 30 TABLET | Refills: 0 | Status: SHIPPED | OUTPATIENT
Start: 2021-06-28

## 2021-06-28 RX ORDER — ATORVASTATIN CALCIUM 20 MG/1
TABLET, FILM COATED ORAL
Qty: 30 TABLET | Refills: 0 | Status: SHIPPED | OUTPATIENT
Start: 2021-06-28

## 2021-10-29 DIAGNOSIS — I10 ESSENTIAL HYPERTENSION: ICD-10-CM

## 2021-10-31 RX ORDER — LISINOPRIL 20 MG/1
TABLET ORAL
Qty: 30 TABLET | Refills: 0 | Status: SHIPPED | OUTPATIENT
Start: 2021-10-31

## 2021-11-26 ENCOUNTER — TELEPHONE (OUTPATIENT)
Dept: FAMILY MEDICINE CLINIC | Age: 61
End: 2021-11-26

## 2021-11-26 NOTE — TELEPHONE ENCOUNTER
Balbir Macario MD  [facility] Front [facility]       BS    Please schedule in clinic apt to discuss chronic conditions with any provider. No more refills will be given until patient is seen by provider.     Routing comment      Balbir Macario MD      BS       Will provide emergency refill(s) with expectation that patient follows up for Chronic conditions.